# Patient Record
Sex: FEMALE | Race: WHITE | NOT HISPANIC OR LATINO | Employment: FULL TIME | URBAN - METROPOLITAN AREA
[De-identification: names, ages, dates, MRNs, and addresses within clinical notes are randomized per-mention and may not be internally consistent; named-entity substitution may affect disease eponyms.]

---

## 2018-03-05 ENCOUNTER — ANESTHESIA EVENT (INPATIENT)
Dept: OPERATING ROOM | Facility: HOSPITAL | Age: 38
End: 2018-03-05
Payer: COMMERCIAL

## 2018-03-05 DIAGNOSIS — O43.213 PLACENTA ACCRETA IN THIRD TRIMESTER: Primary | ICD-10-CM

## 2018-03-05 PROBLEM — Q79.62 EHLERS-DANLOS SYNDROME TYPE III: Status: ACTIVE | Noted: 2018-03-05

## 2018-03-05 PROBLEM — Z98.891 PREVIOUS CESAREAN SECTION: Status: ACTIVE | Noted: 2018-01-08

## 2018-03-05 PROBLEM — O43.219 PLACENTA ACCRETA, ANTEPARTUM: Status: ACTIVE | Noted: 2018-03-05

## 2018-03-05 PROBLEM — D68.51 FACTOR V LEIDEN CARRIER (CMS/HCC): Status: ACTIVE | Noted: 2018-03-05

## 2018-03-05 NOTE — ANESTHESIA PREPROCEDURE EVALUATION
Anesthesia ROS/MED HX    Anesthesia History - neg   Bleeding disorder (factor 5 leiden)  Pulmonary - neg  Neuro/Psych - neg  Cardiovascular- neg  ROS/MED HX Comments:    Anesthesia History: Placenta percreta   Musculoskeletal: Moris danelanas       Relevant Problems   No active problems are marked relevant to this note.       Physical Exam    Airway   Mallampati: II   TM distance: >3 FB   Neck ROM: full  Cardiovascular - normal   Rhythm: regular   Rate: normal  Pulmonary - normal   clear to auscultation  Other Findings   Back - neg   landmarks identified          Anesthesia Plan    Plan: other and general     Lines and Monitors: additional IV, arterial line and PIV     Airway: direct visual laryngoscopy and oral intubation   ASA 3  Blood Products:     Use of Blood Products Discussed: Yes     Consented to blood products  Anesthetic plan and risks discussed with: patient  Induction:    intravenous   Postop Plan:   Patient Disposition: ICU planned admission   Pain Management: IV analgesics  Comments:    Plan: GETA

## 2018-03-06 ENCOUNTER — ANESTHESIA (INPATIENT)
Dept: OPERATING ROOM | Facility: HOSPITAL | Age: 38
End: 2018-03-06
Payer: COMMERCIAL

## 2018-03-06 PROCEDURE — 63600000 HC DRUGS/DETAIL CODE: Performed by: OBSTETRICS & GYNECOLOGY

## 2018-03-06 PROCEDURE — 37000010 ANESTHESIA SPINAL BLOCK: Performed by: ANESTHESIOLOGY

## 2018-03-06 PROCEDURE — 63600000 HC DRUGS/DETAIL CODE: Performed by: ANESTHESIOLOGY

## 2018-03-06 PROCEDURE — 36430 TRANSFUSION BLD/BLD COMPNT: CPT | Performed by: ANESTHESIOLOGY

## 2018-03-06 PROCEDURE — 25800000 HC PHARMACY IV SOLUTIONS: Performed by: ANESTHESIOLOGY

## 2018-03-06 PROCEDURE — 25000000 HC PHARMACY GENERAL: Performed by: ANESTHESIOLOGY

## 2018-03-06 RX ORDER — NEOSTIGMINE METHYLSULFATE 1 MG/ML
INJECTION INTRAVENOUS AS NEEDED
Status: DISCONTINUED | OUTPATIENT
Start: 2018-03-06 | End: 2018-03-06 | Stop reason: SURG

## 2018-03-06 RX ORDER — FENTANYL CITRATE 50 UG/ML
INJECTION, SOLUTION INTRAMUSCULAR; INTRAVENOUS AS NEEDED
Status: DISCONTINUED | OUTPATIENT
Start: 2018-03-06 | End: 2018-03-06 | Stop reason: SURG

## 2018-03-06 RX ORDER — HYDROMORPHONE HYDROCHLORIDE 1 MG/ML
INJECTION, SOLUTION INTRAMUSCULAR; INTRAVENOUS; SUBCUTANEOUS AS NEEDED
Status: DISCONTINUED | OUTPATIENT
Start: 2018-03-06 | End: 2018-03-06 | Stop reason: SURG

## 2018-03-06 RX ORDER — PROPOFOL 10 MG/ML
INJECTION, EMULSION INTRAVENOUS AS NEEDED
Status: DISCONTINUED | OUTPATIENT
Start: 2018-03-06 | End: 2018-03-06 | Stop reason: SURG

## 2018-03-06 RX ORDER — KETOROLAC TROMETHAMINE 30 MG/ML
INJECTION, SOLUTION INTRAMUSCULAR; INTRAVENOUS AS NEEDED
Status: DISCONTINUED | OUTPATIENT
Start: 2018-03-06 | End: 2018-03-06 | Stop reason: SURG

## 2018-03-06 RX ORDER — SODIUM CHLORIDE, SODIUM GLUCONATE, SODIUM ACETATE, POTASSIUM CHLORIDE AND MAGNESIUM CHLORIDE 30; 37; 368; 526; 502 MG/100ML; MG/100ML; MG/100ML; MG/100ML; MG/100ML
INJECTION, SOLUTION INTRAVENOUS CONTINUOUS PRN
Status: DISCONTINUED | OUTPATIENT
Start: 2018-03-06 | End: 2018-03-06 | Stop reason: SURG

## 2018-03-06 RX ORDER — ONDANSETRON HYDROCHLORIDE 2 MG/ML
INJECTION, SOLUTION INTRAVENOUS AS NEEDED
Status: DISCONTINUED | OUTPATIENT
Start: 2018-03-06 | End: 2018-03-06 | Stop reason: SURG

## 2018-03-06 RX ORDER — ROCURONIUM BROMIDE 10 MG/ML
INJECTION, SOLUTION INTRAVENOUS AS NEEDED
Status: DISCONTINUED | OUTPATIENT
Start: 2018-03-06 | End: 2018-03-06 | Stop reason: SURG

## 2018-03-06 RX ORDER — GLYCOPYRROLATE 0.6MG/3ML
SYRINGE (ML) INTRAVENOUS AS NEEDED
Status: DISCONTINUED | OUTPATIENT
Start: 2018-03-06 | End: 2018-03-06 | Stop reason: SURG

## 2018-03-06 RX ORDER — DEXAMETHASONE SODIUM PHOSPHATE 4 MG/ML
INJECTION, SOLUTION INTRA-ARTICULAR; INTRALESIONAL; INTRAMUSCULAR; INTRAVENOUS; SOFT TISSUE AS NEEDED
Status: DISCONTINUED | OUTPATIENT
Start: 2018-03-06 | End: 2018-03-06 | Stop reason: SURG

## 2018-03-06 RX ADMIN — PROPOFOL 200 MG: 10 INJECTION, EMULSION INTRAVENOUS at 07:59

## 2018-03-06 RX ADMIN — KETOROLAC TROMETHAMINE 30 MG: 30 INJECTION, SOLUTION INTRAMUSCULAR at 08:55

## 2018-03-06 RX ADMIN — SODIUM CHLORIDE: 900 INJECTION, SOLUTION INTRAVENOUS at 08:10

## 2018-03-06 RX ADMIN — Medication 0.4 MG: at 08:46

## 2018-03-06 RX ADMIN — FENTANYL CITRATE 100 MCG: 50 INJECTION, SOLUTION INTRAMUSCULAR; INTRAVENOUS at 08:16

## 2018-03-06 RX ADMIN — SODIUM CHLORIDE, SODIUM GLUCONATE, SODIUM ACETATE, POTASSIUM CHLORIDE AND MAGNESIUM CHLORIDE: 526; 502; 368; 37; 30 INJECTION, SOLUTION INTRAVENOUS at 08:19

## 2018-03-06 RX ADMIN — HYDROMORPHONE HYDROCHLORIDE 1 MG: 1 INJECTION, SOLUTION INTRAMUSCULAR; INTRAVENOUS; SUBCUTANEOUS at 08:46

## 2018-03-06 RX ADMIN — ONDANSETRON 4 MG: 2 INJECTION INTRAMUSCULAR; INTRAVENOUS at 08:34

## 2018-03-06 RX ADMIN — Medication 2 G: at 07:58

## 2018-03-06 RX ADMIN — ROCURONIUM BROMIDE 50 MG: 10 INJECTION, SOLUTION INTRAVENOUS at 07:55

## 2018-03-06 RX ADMIN — Medication 3 MG: at 08:47

## 2018-03-06 RX ADMIN — DEXAMETHASONE SODIUM PHOSPHATE 8 MG: 4 INJECTION, SOLUTION INTRAMUSCULAR; INTRAVENOUS at 08:34

## 2018-03-06 ASSESSMENT — PAIN SCALES - GENERAL: PAIN_LEVEL: 3

## 2018-03-06 NOTE — ANESTHESIA POSTPROCEDURE EVALUATION
Patient: Lamar Sommers    Procedure Summary     Date:  18 Room / Location:  [facility] OR  / [facility] OR    Anesthesia Start:  727 Anesthesia Stop:  920    Procedures:        SECTION, POSS HYSTERECTOMY, POSS BLADDER REPAIR     * FOR OR * (N/A )      HYSTERECTOMY ABDOMINAL SUBTOTAL/SUPRACERVICAL      CYSTO & STENT  SUBPROCEDURE (N/A ) Diagnosis:       Previous  section      Placenta accreta in third trimester      (Previous  section [Z98.891])      (Placenta accreta in third trimester [O43.213])    Surgeon:  Whitney Bonilla DO; Trell Dyer MD Responsible Provider:  Liam Jesus MD    Anesthesia Type:  other, general ASA Status:  3          Anesthesia Type: other, general  PACU Vitals  3/6/2018 0909 - 3/6/2018 1009      3/6/2018 0920 3/6/2018 0925 3/6/2018 0930 3/6/2018 0935    BP: (!)  96/59 - 101/63 -    Temp: - - 36.4 °C (97.6 °F) -    Pulse: (!)  59 60 60 -    Resp: - 20 20 12    SpO2: 99 % 98 % 97 % -              3/6/2018 0940 3/6/2018 0950 3/6/2018 1000 3/6/2018 1005    BP: 106/67 103/66 105/67 -    Temp: - - - -    Pulse: 64 67 68 66    Resp: 19 (!)  24 18 (!)  25    SpO2: 98 % 99 % 99 % 99 %            Anesthesia Post Evaluation    Pain score: 3  Pain management: adequate  Mode of pain management: IV medication  Patient participation: complete - patient participated  Level of consciousness: awake and alert  Cardiovascular status: acceptable  Respiratory status: acceptable  Hydration status: acceptable  Anesthetic complications: no

## 2018-03-06 NOTE — ANESTHESIA PROCEDURE NOTES
Airway  Urgency: elective    Start Time: 3/6/2018 7:59 AM    General Information and Staff    Patient location during procedure: OR  Anesthesiologist: EVANGELINA GLORIA  Performed: anesthesiologist     Indications and Patient Condition  Indications for airway management: anesthesia  Sedation level: deep  Preoxygenated: yes  Patient position: sniffing      Final Airway Details  Final airway type: endotracheal airway      Successful airway: ETT    Successful intubation technique: direct laryngoscopy  Facilitating devices/methods: intubating stylet  Endotracheal tube insertion site: oral  Blade: Karol  Blade size: #3  Placement verified by: chest auscultation and capnometry   Measured from: lips  Number of attempts at approach: 1

## 2018-03-09 PROBLEM — O43.219 PLACENTA ACCRETA, ANTEPARTUM: Status: RESOLVED | Noted: 2018-03-05 | Resolved: 2018-03-09

## 2018-03-09 PROBLEM — Z98.891 PREVIOUS CESAREAN SECTION: Status: RESOLVED | Noted: 2018-01-08 | Resolved: 2018-03-09

## 2018-03-09 PROBLEM — O43.213 PLACENTA ACCRETA IN THIRD TRIMESTER: Status: RESOLVED | Noted: 2018-03-05 | Resolved: 2018-03-09

## 2019-03-19 ENCOUNTER — TRANSCRIBE ORDERS (OUTPATIENT)
Dept: LAB | Facility: CLINIC | Age: 39
End: 2019-03-19

## 2019-03-19 ENCOUNTER — APPOINTMENT (OUTPATIENT)
Dept: LAB | Facility: CLINIC | Age: 39
End: 2019-03-19
Attending: INTERNAL MEDICINE
Payer: COMMERCIAL

## 2019-03-19 DIAGNOSIS — R00.2 PALPITATIONS: ICD-10-CM

## 2019-03-19 DIAGNOSIS — I34.1 NONRHEUMATIC MITRAL VALVE PROLAPSE: ICD-10-CM

## 2019-03-19 DIAGNOSIS — E78.2 MIXED HYPERLIPIDEMIA: ICD-10-CM

## 2019-03-19 DIAGNOSIS — Q79.60 EHLERS-DANLOS SYNDROME: ICD-10-CM

## 2019-03-19 DIAGNOSIS — I34.1 NONRHEUMATIC MITRAL VALVE PROLAPSE: Primary | ICD-10-CM

## 2019-03-19 DIAGNOSIS — I95.1 ORTHOSTATIC HYPOTENSION: ICD-10-CM

## 2019-03-19 LAB
ALT SERPL-CCNC: 23 IU/L (ref 11–54)
ANION GAP SERPL CALC-SCNC: 11 MEQ/L (ref 3–15)
BUN SERPL-MCNC: 13 MG/DL (ref 8–20)
CALCIUM SERPL-MCNC: 9.3 MG/DL (ref 8.9–10.3)
CHLORIDE SERPL-SCNC: 104 MEQ/L (ref 98–109)
CHOLEST SERPL-MCNC: 183 MG/DL
CO2 SERPL-SCNC: 24 MEQ/L (ref 22–32)
CREAT SERPL-MCNC: 0.7 MG/DL
GFR SERPL CREATININE-BSD FRML MDRD: >60 ML/MIN/1.73M*2
GLUCOSE SERPL-MCNC: 81 MG/DL (ref 70–99)
HDLC SERPL-MCNC: 72 MG/DL
HDLC SERPL: 2.5 {RATIO}
LDLC SERPL CALC-MCNC: 100 MG/DL
NONHDLC SERPL-MCNC: 111 MG/DL
POTASSIUM SERPL-SCNC: 4.5 MEQ/L (ref 3.6–5.1)
SODIUM SERPL-SCNC: 139 MEQ/L (ref 136–144)
TRIGL SERPL-MCNC: 55 MG/DL (ref 30–149)

## 2019-03-19 PROCEDURE — 84460 ALANINE AMINO (ALT) (SGPT): CPT

## 2019-03-19 PROCEDURE — 80061 LIPID PANEL: CPT

## 2019-03-19 PROCEDURE — 36415 COLL VENOUS BLD VENIPUNCTURE: CPT

## 2019-03-19 PROCEDURE — 80048 BASIC METABOLIC PNL TOTAL CA: CPT

## 2019-11-22 ENCOUNTER — OFFICE VISIT (OUTPATIENT)
Dept: SURGERY | Facility: CLINIC | Age: 39
End: 2019-11-22
Payer: COMMERCIAL

## 2019-11-22 VITALS
HEIGHT: 66 IN | HEART RATE: 73 BPM | BODY MASS INDEX: 21.38 KG/M2 | DIASTOLIC BLOOD PRESSURE: 78 MMHG | OXYGEN SATURATION: 99 % | WEIGHT: 133 LBS | SYSTOLIC BLOOD PRESSURE: 118 MMHG

## 2019-11-22 DIAGNOSIS — R10.12 LEFT UPPER QUADRANT PAIN: Primary | ICD-10-CM

## 2019-11-22 PROCEDURE — 99204 OFFICE O/P NEW MOD 45 MIN: CPT | Performed by: SURGERY

## 2019-11-22 RX ORDER — ASPIRIN 81 MG/1
81 TABLET ORAL
COMMUNITY

## 2019-11-22 RX ORDER — DULOXETIN HYDROCHLORIDE 30 MG/1
1 CAPSULE, DELAYED RELEASE ORAL
COMMUNITY
Start: 2018-11-12

## 2019-11-22 RX ORDER — DOCUSATE SODIUM 100 MG/1
100 CAPSULE, LIQUID FILLED ORAL DAILY
COMMUNITY

## 2019-11-22 RX ORDER — ATORVASTATIN CALCIUM 10 MG/1
1 TABLET, FILM COATED ORAL
COMMUNITY
Start: 2019-01-17

## 2019-11-22 NOTE — LETTER
2019     Whitney Bonilla DO  [address]  [place] [place] [address]  [facility] [address]    Patient: Lamar Sommers  YOB: 1980  Date of Visit: 2019      Dear Dr. Bonilla:    Thank you for referring Lamar Sommers to me for evaluation. Below are my notes for this consultation.    If you have questions, please do not hesitate to call me. I look forward to following your patient along with you.         Sincerely,        Esperanza Nino MD        CC: DO Mica Mcallister Jennifer L, MD  2019  5:24 PM  Signed  Chief Complaint:   Chief Complaint   Patient presents with   • Consult     hernia   .    HPI     Patient is a 39 y.o. female who presents with the following: She was referred by Dr. Bonilal for a possible hernia.  She has had 2 pregnancies and 2 C-sections.  Her second  was combined with a hysterectomy for placenta accreta.  This was in 2018 by Dr. Bonilla.  Since then, she has had intermittent pain in the left upper quadrant that she sees with a very subtle bulge.  It seems to come and go.  It seems worse after exercise.  She sometimes sees a bulge and has a sensation in the right lower quadrant as well.  She has started doing more ab work in the gym and has noticed it more.  Discomfort is very vague and mild.  She notes lifelong constipation for which she takes Colace daily and Dulcolax every few weeks as needed.  She denies any nausea or vomiting.  No fevers or chills.  No weight loss or malaise.  No heartburn or reflux.  No chest pain or shortness of breath.  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  She has had no other abdominal surgery..     Medical History:   History reviewed. No pertinent past medical history.    Surgical History:   History reviewed. No pertinent surgical history.    Social History:   Social History     Tobacco Use    • Smoking status: Never Smoker   • Smokeless tobacco: Never Used   Substance Use Topics   • Alcohol use: Yes     Alcohol/week: 2.0 standard drinks     Types: 2 Glasses of wine per week     Comment: not while pregnant   • Drug use: No       Family History:   History reviewed. No pertinent family history.    Allergies:   Compazine [prochlorperazine] and Vicodin [hydrocodone-acetaminophen]    Current Medications:      Current Outpatient Medications:   •  aspirin 81 mg enteric coated tablet, Take 81 mg by mouth., Disp: , Rfl:   •  atorvastatin (LIPITOR) 10 mg tablet, Take 1 tablet by mouth., Disp: , Rfl:   •  docusate sodium (COLACE) 100 mg capsule, Take 100 mg by mouth daily., Disp: , Rfl:   •  DULoxetine (CYMBALTA) 30 mg capsule, Take 1 capsule by mouth., Disp: , Rfl:   •  silver sulfadiazine (SILVADENE) 1 % cream, Apply topically 2 (two) times a day for 18 doses. Prn rash/tape burn, Disp: 50 g, Rfl: 0    Review of Systems 14 other systems reviewed and findings not mentioned in HPI are not pertinent to the presenting problem.    Objective     Vital Signs for the last 24 hours:  Heart Rate:  [73] 73  BP: (118)/(78) 118/78    Physicial Exam  Gen: NAD  Skin: warm and dry, no jaundice  Affect: Nl  Gait: Nl  HEENT: NC/AT, EOMI, no scleral icterus  Neck: Supple  Cv: Reg  Lungs: CTA B/L  Abdomen: Soft, nontender, nondistended, positive bowel sounds; well-healed periumbilical midline incision; small, chronically incarcerated umbilical hernia, nontender; very subtle bulging at the upper aspect of the incision to the left of the midline and at the lower aspect of the incision to the right of the midline, only visible in the standing position; when supine I appreciate a possible weakness to the left of the incision at the upper aspect measuring about 2 cm in diameter and mildly tender; the rest of the fascia feels intact and there is no obvious definite hernia  Ext: WWP, no edema      TESTS: There is no pertinent imaging to  review    Assesment/Plan:    Problem List Items Addressed This Visit        Nervous    Left upper quadrant pain - Primary     Her history is suspicious for hernia, but her exam does not definitely confirm this.  Her tissue may be weaker and therefore a fascial defect harder to feel based on her Moris-Danlos syndrome.  I would like to check a CT of the abdomen and pelvis to look for an incisional or ventral hernia.  Further recommendations will based upon the results of the CT.  She should maintain a bowel regimen.  She restrictions at this point.         Relevant Orders    CT ABDOMEN PELVIS WITHOUT IV CONTRAST            Esperanza Nino MD 11/22/2019 5:24 PM

## 2019-11-22 NOTE — ASSESSMENT & PLAN NOTE
Her history is suspicious for hernia, but her exam does not definitely confirm this.  Her tissue may be weaker and therefore a fascial defect harder to feel based on her Moris-Danlos syndrome.  I would like to check a CT of the abdomen and pelvis to look for an incisional or ventral hernia.  Further recommendations will based upon the results of the CT.  She should maintain a bowel regimen.  She restrictions at this point.

## 2019-11-22 NOTE — H&P
Chief Complaint:   Chief Complaint   Patient presents with   • Consult     hernia   .    HPI     Patient is a 39 y.o. female who presents with the following: She was referred by Dr. Bonilla for a possible hernia.  She has had 2 pregnancies and 2 C-sections.  Her second  was combined with a hysterectomy for placenta accreta.  This was in 2018 by Dr. Bonilla.  Since then, she has had intermittent pain in the left upper quadrant that she sees with a very subtle bulge.  It seems to come and go.  It seems worse after exercise.  She sometimes sees a bulge and has a sensation in the right lower quadrant as well.  She has started doing more ab work in the gym and has noticed it more.  Discomfort is very vague and mild.  She notes lifelong constipation for which she takes Colace daily and Dulcolax every few weeks as needed.  She denies any nausea or vomiting.  No fevers or chills.  No weight loss or malaise.  No heartburn or reflux.  No chest pain or shortness of breath.  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  She has had no other abdominal surgery..     Medical History:   History reviewed. No pertinent past medical history.    Surgical History:   History reviewed. No pertinent surgical history.    Social History:   Social History     Tobacco Use   • Smoking status: Never Smoker   • Smokeless tobacco: Never Used   Substance Use Topics   • Alcohol use: Yes     Alcohol/week: 2.0 standard drinks     Types: 2 Glasses of wine per week     Comment: not while pregnant   • Drug use: No       Family History:   History reviewed. No pertinent family history.    Allergies:   Compazine [prochlorperazine] and Vicodin [hydrocodone-acetaminophen]    Current Medications:      Current Outpatient Medications:   •  aspirin 81 mg enteric coated tablet, Take 81 mg by mouth., Disp: , Rfl:   •  atorvastatin (LIPITOR) 10 mg tablet, Take 1  tablet by mouth., Disp: , Rfl:   •  docusate sodium (COLACE) 100 mg capsule, Take 100 mg by mouth daily., Disp: , Rfl:   •  DULoxetine (CYMBALTA) 30 mg capsule, Take 1 capsule by mouth., Disp: , Rfl:   •  silver sulfadiazine (SILVADENE) 1 % cream, Apply topically 2 (two) times a day for 18 doses. Prn rash/tape burn, Disp: 50 g, Rfl: 0    Review of Systems 14 other systems reviewed and findings not mentioned in HPI are not pertinent to the presenting problem.    Objective     Vital Signs for the last 24 hours:  Heart Rate:  [73] 73  BP: (118)/(78) 118/78    Physicial Exam  Gen: NAD  Skin: warm and dry, no jaundice  Affect: Nl  Gait: Nl  HEENT: NC/AT, EOMI, no scleral icterus  Neck: Supple  Cv: Reg  Lungs: CTA B/L  Abdomen: Soft, nontender, nondistended, positive bowel sounds; well-healed periumbilical midline incision; small, chronically incarcerated umbilical hernia, nontender; very subtle bulging at the upper aspect of the incision to the left of the midline and at the lower aspect of the incision to the right of the midline, only visible in the standing position; when supine I appreciate a possible weakness to the left of the incision at the upper aspect measuring about 2 cm in diameter and mildly tender; the rest of the fascia feels intact and there is no obvious definite hernia  Ext: WWP, no edema      TESTS: There is no pertinent imaging to review    Assesment/Plan:    Problem List Items Addressed This Visit        Nervous    Left upper quadrant pain - Primary     Her history is suspicious for hernia, but her exam does not definitely confirm this.  Her tissue may be weaker and therefore a fascial defect harder to feel based on her Moris-Danlos syndrome.  I would like to check a CT of the abdomen and pelvis to look for an incisional or ventral hernia.  Further recommendations will based upon the results of the CT.  She should maintain a bowel regimen.  She restrictions at this point.         Relevant Orders     CT ABDOMEN PELVIS WITHOUT IV CONTRAST            Esperanza Nino MD 11/22/2019 5:24 PM

## 2019-12-04 ENCOUNTER — TELEPHONE (OUTPATIENT)
Dept: SURGERY | Facility: CLINIC | Age: 39
End: 2019-12-04

## 2019-12-10 ENCOUNTER — HOSPITAL ENCOUNTER (OUTPATIENT)
Dept: RADIOLOGY | Facility: HOSPITAL | Age: 39
Discharge: HOME | End: 2019-12-10
Attending: SURGERY
Payer: COMMERCIAL

## 2019-12-10 DIAGNOSIS — R10.12 LEFT UPPER QUADRANT PAIN: ICD-10-CM

## 2019-12-10 PROCEDURE — 74176 CT ABD & PELVIS W/O CONTRAST: CPT

## 2021-04-14 DIAGNOSIS — Z23 ENCOUNTER FOR IMMUNIZATION: ICD-10-CM
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
## 2021-04-15 VITALS — BODY MASS INDEX: 21.38 KG/M2 | WEIGHT: 133 LBS | HEIGHT: 66 IN

## 2021-04-16 ENCOUNTER — TELEMEDICINE (OUTPATIENT)
Dept: GYNECOLOGIC ONCOLOGY | Facility: CLINIC | Age: 41
End: 2021-04-16
Payer: COMMERCIAL

## 2021-04-16 DIAGNOSIS — F52.9 FEMALE SEXUAL DYSFUNCTION: Primary | ICD-10-CM

## 2021-04-16 DIAGNOSIS — F43.10 PTSD (POST-TRAUMATIC STRESS DISORDER): ICD-10-CM

## 2021-04-16 PROCEDURE — 99205 OFFICE O/P NEW HI 60 MIN: CPT | Mod: 95 | Performed by: OBSTETRICS & GYNECOLOGY

## 2021-04-16 PROCEDURE — 200200 PR NO CHARGE: Performed by: OBSTETRICS & GYNECOLOGY

## 2021-04-16 PROCEDURE — G2212 PROLONG OUTPT/OFFICE VIS: HCPCS | Mod: 95 | Performed by: OBSTETRICS & GYNECOLOGY

## 2021-04-16 NOTE — PATIENT INSTRUCTIONS
Dr. Nichelle Coles  Frequently Asked Questions    1.  Who should I see/contact for routine gynecologic care?   Please be aware that my evaluation does not include routine gyn care (eg. contraception, abnormal bleeding, vaginitis, STD screening, cancer screening or surveillance), thus you should continue routine gyn care with your primary OBGYN/women's health provider:   Dr. Bonilla.   Next annual gyn office visit due: 3/2021.    2.  What are we going to work on?  Address sexual issues.    3.  Who will you send updates, medical records including progress notes to?  As verbally consented by you, I will send this information to these providers:   Dr. Bonilla, Dr. Emmanuel, Dr. Mann, Courtney Torres    4. Can I view my progress note from the visit?   YES: If you have enrolled in the [employer] patient portal, you will have access to the visit progress notes, as soon as I have completed the note (usually within 24-48 hours after the visit).  Please try to review the progress note, to confirm accuracy and aid in collaborative care.  [employer] allows for note addendums up to 30 days after that visit. If you have questions about the progress note, or have addendum requests, please call the office to schedule a time for us to review the note together, and modify if needed.     5.  How best to contact you for questions/concerns?  Please leave a message with the main office staff [phone] during general office hours (Mon-Fri, 8:30-4:30p), or message me through [employer], and I will try to return your call within 2 business days.  Please note: Showbiet is for non-emergent, non-urgent questions/concerns. If the question/concern requires more than a simple answer, I may recommend that we discuss this via telemedicine or in-office appointment.      6.   Do you have any financial ties with any products, meds or medical devices?  No, I do NOT.  My recommendations are based on clinical experience and the available  data/research.    7.   What if we run into each other locally?  Let's keep the conversation light, in order to protect confidentiality. Please know that we will not discuss issues/treatment.

## 2022-04-11 ENCOUNTER — TRANSCRIBE ORDERS (OUTPATIENT)
Dept: REGISTRATION | Facility: CLINIC | Age: 42
End: 2022-04-11

## 2022-04-11 ENCOUNTER — TRANSCRIBE ORDERS (OUTPATIENT)
Dept: SCHEDULING | Age: 42
End: 2022-04-11

## 2022-04-11 ENCOUNTER — HOSPITAL ENCOUNTER (OUTPATIENT)
Dept: RADIOLOGY | Facility: CLINIC | Age: 42
Discharge: HOME | End: 2022-04-11
Attending: INTERNAL MEDICINE
Payer: COMMERCIAL

## 2022-04-11 DIAGNOSIS — J45.20 MILD INTERMITTENT ASTHMA, UNCOMPLICATED: ICD-10-CM

## 2022-04-11 DIAGNOSIS — R06.00 DYSPNEA, UNSPECIFIED: ICD-10-CM

## 2022-04-11 DIAGNOSIS — R06.00 DYSPNEA, UNSPECIFIED: Primary | ICD-10-CM

## 2022-04-11 PROCEDURE — 71046 X-RAY EXAM CHEST 2 VIEWS: CPT

## 2022-04-25 ENCOUNTER — HOSPITAL ENCOUNTER (OUTPATIENT)
Dept: RADIOLOGY | Facility: HOSPITAL | Age: 42
Discharge: HOME | End: 2022-04-25
Attending: INTERNAL MEDICINE
Payer: COMMERCIAL

## 2022-04-25 DIAGNOSIS — R06.00 DYSPNEA, UNSPECIFIED: ICD-10-CM

## 2022-04-25 PROCEDURE — 71250 CT THORAX DX C-: CPT

## 2022-05-23 ENCOUNTER — HOSPITAL ENCOUNTER (OUTPATIENT)
Dept: PULMONOLOGY | Facility: HOSPITAL | Age: 42
Discharge: HOME | End: 2022-05-23
Attending: INTERNAL MEDICINE
Payer: COMMERCIAL

## 2022-05-23 DIAGNOSIS — J45.20 MILD INTERMITTENT ASTHMA, UNCOMPLICATED: ICD-10-CM

## 2022-05-23 PROCEDURE — 25000013 HC METHACHOLINE CHLORIDE THROUGH NEBULIZER, PER 1 MG

## 2022-05-23 PROCEDURE — 94070 EVALUATION OF WHEEZING: CPT

## 2022-05-23 PROCEDURE — 25000000 HC PHARMACY GENERAL: Performed by: INTERNAL MEDICINE

## 2022-05-23 PROCEDURE — 95070 INHLJ BRNCL CHALLENGE TSTG: CPT

## 2022-05-23 RX ORDER — ALBUTEROL SULFATE 0.83 MG/ML
2.5 SOLUTION RESPIRATORY (INHALATION) ONCE
Status: COMPLETED | OUTPATIENT
Start: 2022-05-23 | End: 2022-05-23

## 2022-05-23 RX ADMIN — ALBUTEROL SULFATE 2.5 MG: 2.5 SOLUTION RESPIRATORY (INHALATION) at 15:42

## 2022-05-24 ENCOUNTER — HOSPITAL ENCOUNTER (EMERGENCY)
Facility: HOSPITAL | Age: 42
Discharge: HOME | End: 2022-05-24
Attending: EMERGENCY MEDICINE
Payer: COMMERCIAL

## 2022-05-24 ENCOUNTER — APPOINTMENT (EMERGENCY)
Dept: RADIOLOGY | Facility: HOSPITAL | Age: 42
End: 2022-05-24
Attending: EMERGENCY MEDICINE
Payer: COMMERCIAL

## 2022-05-24 VITALS
SYSTOLIC BLOOD PRESSURE: 121 MMHG | HEART RATE: 86 BPM | RESPIRATION RATE: 18 BRPM | OXYGEN SATURATION: 95 % | DIASTOLIC BLOOD PRESSURE: 74 MMHG | TEMPERATURE: 97 F

## 2022-05-24 DIAGNOSIS — S16.1XXA CERVICAL STRAIN, ACUTE, INITIAL ENCOUNTER: Primary | ICD-10-CM

## 2022-05-24 DIAGNOSIS — S06.0X0A CONCUSSION WITHOUT LOSS OF CONSCIOUSNESS, INITIAL ENCOUNTER: ICD-10-CM
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
## 2022-05-24 PROCEDURE — 99284 EMERGENCY DEPT VISIT MOD MDM: CPT | Mod: 25

## 2022-05-24 PROCEDURE — G1004 CDSM NDSC: HCPCS

## 2022-05-24 PROCEDURE — 70450 CT HEAD/BRAIN W/O DYE: CPT | Mod: MG

## 2022-05-25 ENCOUNTER — TRANSCRIBE ORDERS (OUTPATIENT)
Dept: REGISTRATION | Facility: REHABILITATION | Age: 42
End: 2022-05-25

## 2022-05-25 DIAGNOSIS — S06.0X0A CONCUSSION WITHOUT LOSS OF CONSCIOUSNESS, INITIAL ENCOUNTER: Primary | ICD-10-CM
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
## 2022-06-07 ENCOUNTER — TELEPHONE (OUTPATIENT)
Dept: SCHEDULING | Facility: REHABILITATION | Age: 42
End: 2022-06-07
Payer: COMMERCIAL

## 2022-06-07 NOTE — TELEPHONE ENCOUNTER
Left message for patient.  Asked that she call back with her Auto Insurance information and claim number.  The [employer] information she originally provided is the other 's claim.

## 2022-06-08 ENCOUNTER — TELEPHONE (OUTPATIENT)
Dept: SCHEDULING | Facility: REHABILITATION | Age: 42
End: 2022-06-08
Payer: COMMERCIAL

## 2022-06-08 NOTE — TELEPHONE ENCOUNTER
Left second message for patient.  Asked patient to call back with her Auto Insurance information and claim number.

## 2022-06-14 ENCOUNTER — HOSPITAL ENCOUNTER (OUTPATIENT)
Dept: OCCUPATIONAL THERAPY | Age: 42
Setting detail: THERAPIES SERIES
Discharge: HOME | End: 2022-06-14
Attending: FAMILY MEDICINE
Payer: COMMERCIAL

## 2022-06-14 DIAGNOSIS — S06.0X0A CONCUSSION WITHOUT LOSS OF CONSCIOUSNESS, INITIAL ENCOUNTER: ICD-10-CM
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
## 2022-06-14 PROCEDURE — 97166 OT EVAL MOD COMPLEX 45 MIN: CPT | Mod: GO

## 2022-06-14 PROCEDURE — 97535 SELF CARE MNGMENT TRAINING: CPT | Mod: GO
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
## 2022-06-15 ENCOUNTER — HOSPITAL ENCOUNTER (OUTPATIENT)
Dept: PHYSICAL THERAPY | Age: 42
Setting detail: THERAPIES SERIES
Discharge: HOME | End: 2022-06-15
Attending: FAMILY MEDICINE
Payer: COMMERCIAL
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
## 2022-06-15 DIAGNOSIS — S06.0X0A CONCUSSION WITHOUT LOSS OF CONSCIOUSNESS, INITIAL ENCOUNTER: ICD-10-CM

## 2022-06-15 PROCEDURE — 97162 PT EVAL MOD COMPLEX 30 MIN: CPT | Mod: GP

## 2022-06-15 PROCEDURE — 97530 THERAPEUTIC ACTIVITIES: CPT | Mod: GP

## 2022-06-15 PROCEDURE — 97112 NEUROMUSCULAR REEDUCATION: CPT | Mod: GP
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
## 2022-06-15 NOTE — OP OT TREATMENT LOG
"VISION/CONCUSSION OT FLOW SHEET    OT Vision/mTBI  EXERCISES CURRENT SESSION TIME   NEURO RE-ED  CPT 40066 TOTAL TIME FOR SESSION Not performed   Initial Evaluation 06/14/22 - Completed the initial evaluation - metrics to be completed as symptoms allow - unable to complete today secondary to strong headache and fatigue.    Dynavision     VTS3/VTS4     CPT     BITs     NVR     Saccadic Fixation     Ocular Motor Control     Visual Tracing     3D Tracking     Visual Perception     Convergence/Divergence     Accommodation     Visual Stimulation     THER ACT  CPT 03213 TOTAL TIME FOR SESSION 0-7 Minutes   Pain, Vitals, Meds, Etc.  Reviewed   HEP     Visual Endurance      Work/School Simulation      SELF CARE  CPT 87827 TOTAL TIME FOR SESSION 8-22 Minutes   PCS Symptom Management/Education  Provided the \"1 to 10 functional concussion symptom scale\" and energy and symptom management techniques and strategies. Patient demonstrated good verbal understanding of the information and states will try to apply this approach in daily life.     ADL/IADLs                                                                                      "

## 2022-06-15 NOTE — OP PT TREATMENT LOG
VESTIBULAR PT FLOWSHEET    P.T. TREATMENT LOG   Precautions:    Eval Date:  Progress Note:    POC expires:  Insurance Limits:   Treatment Current Session Time   CANALITH  REPOSITIONING TREATMENT  (CPT 13635) Y/N Notes Not performed   CRT      NEURO RE-ED  (CPT 71404) Y/N Notes 8-22 Minutes   BPPV ASSESSMENT N No recent c/o vertigo   VOMS Y See objective section   VOR CANCELLATION                      Standing H/VVOR-C     Ambulation w/ H/VVOR-C     VOR / GAZE STABILITY     Standing H/VVOR     Ambulation w/H/VVOR     H/VVOR on compliant surfaces     Functional VOR     HABITUATION     Ball circles     Repetitive functional movements     BALANCE- STATIC     On floor     Airex foam     Rockerboard     BALANCE- DYNAMIC     Ambulation-head turns/nods     Ambulation - 180 & 360 degree turns     Retro ambulation EO     Ambulation with EC     Obstacles     Tandem     OKS     MSQ  SOT  FGA  DVA       Y       See objective section   THER-EX   (CPT 89112) Y/N SETS REPS LOAD Not performed   CARDIOVASCULAR               THER ACT  (CPT 72872) Y/N  8-22 Minutes   Review pain, meds, falls, vitals, symptoms Y    *Subjective Measures   ABC  DHI     Y  Y   (6/15) 86%  (6/15) 44/100   Patient Education/HEP Y (6/15) Patient educated on function of vestibular system, sensory integration, PCS, results of objective testing, POC going forward. Verbalizes understanding.
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
## 2022-06-20 ENCOUNTER — HOSPITAL ENCOUNTER (OUTPATIENT)
Dept: OCCUPATIONAL THERAPY | Age: 42
Setting detail: THERAPIES SERIES
Discharge: HOME | End: 2022-06-20
Attending: FAMILY MEDICINE
Payer: COMMERCIAL

## 2022-06-20 DIAGNOSIS — S06.0X0A CONCUSSION WITHOUT LOSS OF CONSCIOUSNESS, INITIAL ENCOUNTER: Primary | ICD-10-CM

## 2022-06-20 PROCEDURE — 97112 NEUROMUSCULAR REEDUCATION: CPT | Mod: GO

## 2022-06-20 PROCEDURE — 97530 THERAPEUTIC ACTIVITIES: CPT | Mod: GO

## 2022-06-21 ENCOUNTER — HOSPITAL ENCOUNTER (OUTPATIENT)
Dept: PHYSICAL THERAPY | Age: 42
Setting detail: THERAPIES SERIES
Discharge: HOME | End: 2022-06-21
Attending: FAMILY MEDICINE
Payer: COMMERCIAL

## 2022-06-21 DIAGNOSIS — S06.0X0A CONCUSSION WITHOUT LOSS OF CONSCIOUSNESS, INITIAL ENCOUNTER: Primary | ICD-10-CM

## 2022-06-21 PROCEDURE — 97530 THERAPEUTIC ACTIVITIES: CPT | Mod: GP

## 2022-06-21 PROCEDURE — 97112 NEUROMUSCULAR REEDUCATION: CPT | Mod: GP

## 2022-06-21 NOTE — OP OT TREATMENT LOG
"VISION/CONCUSSION OT FLOW SHEET    OT Vision/mTBI  EXERCISES CURRENT SESSION TIME   NEURO RE-ED  CPT 59619 TOTAL TIME FOR SESSION 23-37 Minutes   Initial Evaluation 06/14/22 - Completed the initial evaluation - metrics to be completed as symptoms allow - unable to complete today secondary to strong headache and fatigue.    Dynavision 6/20/22  D2 divided attention, reaction timing, and task endurance trials as follows:          Program                         Score               Avg Reaction             B 5 sec                          79                       0.75 sec      Poor overall tolerance with an increase in symptoms during the activity - 4/10 Headache and fatigue -  Rest required         VTS3/VTS4     CPT     BITs     NVR     Saccadic Fixation 06/20/22 - completed saccades measures - refer to \"vision\" section attached for scores    Ocular Motor Control     Visual Tracing     3D Tracking     Visual Perception     Convergence/Divergence     Accommodation     Visual Stimulation     THER ACT  CPT 17999 TOTAL TIME FOR SESSION 23-37 Minutes   Pain, Vitals, Meds, Etc. Reviewed Reviewed   HEP 6/20/22 - provided and practiced the following home exercises to be completed every other day once AM and once PM using the \"1 to 10 functional concusion symptom scale\" as a guide to intensity  1) Level 1 horizontal saccades (needed quadrant cover and blue overlay to better tolerate all exercises provided today)  2) Level 1 vertical saccades  3) Leval 1 Tracing  4) Pencil Push Ups      Visual Endurance      Work/School Simulation      SELF CARE  CPT 16536 TOTAL TIME FOR SESSION 8-22 Minutes   PCS Symptom Management/Education 6/20/22  Reviewed the use of \"1 to 10 functional concussion symptom scale\" and energy and symptom management techniques and strategies for exercises and daily routine. Patient demonstrated good verbal understanding of the information and states will try to apply this approach in daily life. Provided " information and practiced the use of blue overlays.        ADL/IADLs
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
## 2022-06-21 NOTE — OP PT TREATMENT LOG
"VESTIBULAR PT FLOWSHEET    P.T. TREATMENT LOG   Precautions:    Eval Date: 6-15-22 Progress Note:    POC expires: 9-13-22 Insurance Limits:   Treatment Current Session Time   CANALITH  REPOSITIONING TREATMENT  (CPT 62271) Y/N Notes Not performed   CRT      NEURO RE-ED  (CPT 93019) Y/N Notes 38-52 Minutes   BPPV ASSESSMENT N No recent c/o vertigo   VOMS  See objective section   VOR CANCELLATION                      Standing H/VVOR-C y 6-21-22: FA with 1 inch B hand held:  - HVORC toward windows of gym X 45\"- 3/10 dizziness  FA with 1 inch B on reacher facing grey wall:  - VVORC X 1 minute- no dizziness     Ambulation w/ H/VVOR-C     VOR / GAZE STABILITY     Standing H/VVOR y 6-21-22: Standing with FA and 6 feet from 1 inch B on wall:  -HVOR X 1 at SSV X 30\"- no increase/ fatigued  -VVOR X 1 at SSV X 30\"- no increase/ fatigue     Ambulation w/H/VVOR     H/VVOR on compliant surfaces     Functional VOR     HABITUATION     Ball circles     Repetitive functional movements     BALANCE- STATIC     On floor     Airex foam     Rockerboard     BALANCE- DYNAMIC     Ambulation-head turns/nods     Ambulation - 180 & 360 degree turns     Retro ambulation EO     Ambulation with EC     Obstacles     Tandem     OKS     MSQ  SOT  FGA  DVA y  y       6-21-22:17%   6-21-22: 85%    See objective section   THER-EX   (CPT 64741) Y/N SETS REPS LOAD Not performed   CARDIOVASCULAR               THER ACT  (CPT 46569) Y/N  8-22 Minutes   Review pain, meds, falls, vitals, symptoms Y    *Subjective Measures   ABC  DHI        (6/15) 86%  (6/15) 44/100   Patient Education/HEP Y (6/15) Patient educated on function of vestibular system, sensory integration, PCS, results of objective testing, POC going forward. Verbalizes understanding.   6-21-22: Issued HEP as follows with patient demonstrating proper technique in clinic and verbalizing understanding: VORC in standing, VOR X 1 in standing.       Motion Sensitivity Quotient   DATE:   6-21-22           " "      Baseline Level (0-5):     0/5               MSQ Score:17%    POSITION INTENSITY ADJUSTED  INTENSITY DURATION SCORE   Sitting to Supine No increase- \"feels better\"      Supine to Left Side Lying No increase      Supine to Right Side Lying No increase      Supine to Sit 2.5 2.5 2 4.5   Left Hallpike NT      to  Sitting NT      Right Hallpike NT      to Sitting NT      Sitting Nose to Left Knee No increase dizz, increased HA      to Sitting 4 4 2 6   Sitting Nose to Right Knee No increase      to Sitting 4 4 2 6   Sitting Head Rotation (4X) 3 3 1 4   Sitting Head Flex & Ext (4X) 2 2 0 2   Standing  360° Turn to Right 1.5 1.5 0 1.5   Standing 360° Turn to Left 3.5 3.5 1 4.5     Intensity: Scale 0-5 (0= no symptoms and 5= severe symptoms)  Adjusted Intensity: Intensity Level - Baseline Level  Duration: Scale from 0 to 3 for return to baseline (5-10”=1, 11-30”=2, >  30”=3)  Score: Adjusted Intensity + Duration  MOTION SENSITIVITY QUOTIENT % = # Provoking Positions X Total Score   X 100                                                                                                          2048   *If < 16 positions are tested, 2048 is replaced with 8 X (# of positions tested)2          "

## 2022-06-21 NOTE — PATIENT INSTRUCTIONS
Gaze Stabilization: Tip Card    1.Target must remain in focus, not blurry, and appear stationary while head is in motion.  2.Perform exercises with small head movements (45° to either side of midline).  3.Increase speed of head motion so long as target is in focus.  4.If you wear eyeglasses, be sure you can see target through lens (therapist will give specific instructions for bifocal / progressive lenses).  5.These exercises may provoke dizziness or nausea. Work through these symptoms. If too dizzy, slow head movement slightly. Rest between each exercise.  6.Exercises demand concentration; avoid distractions.  7.For safety, perform standing exercises close to a counter, wall, corner, or next to someone.    Copyright © [employer]. All rights reserved.   Gaze Stabilization: Standing Feet Apart        Feet shoulder width apart, keeping eyes on target on wall __6__ feet away, tilt head down 15-30° and move head side to side for __60__ seconds. Repeat while moving head up and down for _60___ seconds.  Do __3__ sessions per day.      Copyright © [employer]. All rights reserved.   Visuo-Vestibular: Head / Eyes Moving in Same Direction        Holding a target, keep eyes on target and slowly move target, head, and eyes in SAME direction side to side for __60__ seconds. Repeat up and down for one minute. Perform standing  Do __3__ sessions per day. Repeat using target on pattern background.    Copyright © [employer]. All rights reserved.
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
## 2022-06-23 ENCOUNTER — HOSPITAL ENCOUNTER (OUTPATIENT)
Dept: OCCUPATIONAL THERAPY | Age: 42
Setting detail: THERAPIES SERIES
Discharge: HOME | End: 2022-06-23
Attending: FAMILY MEDICINE
Payer: COMMERCIAL

## 2022-06-23 DIAGNOSIS — S06.0X0A CONCUSSION WITHOUT LOSS OF CONSCIOUSNESS, INITIAL ENCOUNTER: Primary | ICD-10-CM

## 2022-06-23 PROCEDURE — 97530 THERAPEUTIC ACTIVITIES: CPT | Mod: GO

## 2022-06-23 PROCEDURE — 97535 SELF CARE MNGMENT TRAINING: CPT | Mod: GO
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
## 2022-06-24 NOTE — OP OT TREATMENT LOG
"VISION/CONCUSSION OT FLOW SHEET    OT Vision/mTBI  EXERCISES CURRENT SESSION TIME   NEURO RE-ED  CPT 58657 TOTAL TIME FOR SESSION 0-7 Minutes   Initial Evaluation 06/14/22 - Completed the initial evaluation - metrics to be completed as symptoms allow - unable to complete today secondary to strong headache and fatigue.    Dynavision 6/20/22  D2 divided attention, reaction timing, and task endurance trials as follows:          Program                         Score               Avg Reaction             B 5 sec                          79                       0.75 sec      Poor overall tolerance with an increase in symptoms during the activity - 4/10 Headache and fatigue -  Rest required         VTS3/VTS4     CPT     BITs     NVR     Saccadic Fixation 06/20/22 - completed saccades measures - refer to \"vision\" section attached for scores    Ocular Motor Control     Visual Tracing     3D Tracking     Visual Perception     Convergence/Divergence     Accommodation     Visual Stimulation     THER ACT  CPT 22954 TOTAL TIME FOR SESSION 8-22 Minutes   Pain, Vitals, Meds, Etc. Reviewed Reviewed   HEP 06/23/22  Reviewed and additionally explained and practiced the following home exercises to be completed every other day once AM and once PM using the \"1 to 10 functional concusion symptom scale\" as a guide to intensity  1) Level 1 horizontal saccades (needed quadrant cover and blue overlay to better tolerate all exercises provided today)  2) Level 1 vertical saccades  3) Leval 1 Tracing  4) Pencil Push Ups      Visual Endurance      Work/School Simulation      SELF CARE  CPT 42359 TOTAL TIME FOR SESSION 38-52 Minutes   PCS Symptom Management/Education 6/23/22  Reviewed the use of \"1 to 10 functional concussion symptom scale\" and energy and symptom management techniques and strategies for exercises and daily routine especially as a result of her episode of severe symptoms on 6/21/22. Specifically discussed use of strategies to " delegate some duties temporarily during recovery. Patient demonstrated good verbal understanding of the information and states will try to apply this approach in daily life. Provided information and practiced the use of blue overlays.        ADL/IADLs

## 2022-06-27 ENCOUNTER — HOSPITAL ENCOUNTER (OUTPATIENT)
Dept: OCCUPATIONAL THERAPY | Age: 42
Setting detail: THERAPIES SERIES
Discharge: HOME | End: 2022-06-27
Attending: FAMILY MEDICINE
Payer: COMMERCIAL

## 2022-06-27 DIAGNOSIS — S06.0X0A CONCUSSION WITHOUT LOSS OF CONSCIOUSNESS, INITIAL ENCOUNTER: Primary | ICD-10-CM

## 2022-06-27 PROCEDURE — 97112 NEUROMUSCULAR REEDUCATION: CPT | Mod: GO

## 2022-06-27 PROCEDURE — 97535 SELF CARE MNGMENT TRAINING: CPT | Mod: GO

## 2022-06-27 NOTE — OP OT TREATMENT LOG
"VISION/CONCUSSION OT FLOW SHEET    OT Vision/mTBI  EXERCISES CURRENT SESSION TIME   NEURO RE-ED  CPT 60384 TOTAL TIME FOR SESSION 38-52 Minutes   Initial Evaluation 06/14/22 - Completed the initial evaluation - metrics to be completed as symptoms allow - unable to complete today secondary to strong headache and fatigue.    Dynavision 6/27/20  D2 divided attention, reaction timing, and task endurance trials as follows:          Program                         Score               Avg Reaction             B 5 sec                           87                       0.68 sec   3 sec 30% Green full board   65R/29G          0.65R & 0.61 G  Fair overall tolerance with a mild increase in symptoms during the activity - 2/10 dizziness and headache  - 1/10 with short rest         6/20/22  D2 divided attention, reaction timing, and task endurance trials as follows:          Program                         Score               Avg Reaction             B 5 sec                          79                       0.75 sec      Poor overall tolerance with an increase in symptoms during the activity - 4/10 Headache and fatigue -  Rest required         VTS3/VTS4     CPT     BITs     NVR     Saccadic Fixation 06/27/22  Patient completed a close focus visual scanning and discrimination activity using the \"Stepping Stones\" challenge. She needed two cues to complete -she did not demonstrate good attention to details - she became verbally frustrated - could not find the envelope - 2/10 dizziness.    06/20/22 - completed saccades measures - refer to \"vision\" section attached for scores    Ocular Motor Control     Visual Tracing     3D Tracking 6/27/22  Seated tracking activity with \"Capture and Control\" using fabric rackets and a small blue spike ball - pt able to engage for approx 5 tosses then symptoms elevated - stopped activity - provided rest - this was the end of the session.    Visual Perception     Convergence/Divergence   " "  Accommodation     Visual Stimulation     THER ACT  CPT 42905 TOTAL TIME FOR SESSION 0-7 Minutes   Pain, Vitals, Meds, Etc. Reviewed Reviewed   HEP 06/23/22  Reviewed and additionally explained and practiced the following home exercises to be completed every other day once AM and once PM using the \"1 to 10 functional concusion symptom scale\" as a guide to intensity  1) Level 1 horizontal saccades (needed quadrant cover and blue overlay to better tolerate all exercises provided today)  2) Level 1 vertical saccades  3) Leval 1 Tracing  4) Pencil Push Ups      Visual Endurance      Work/School Simulation      SELF CARE  CPT 83836 TOTAL TIME FOR SESSION 8-22 Minutes   PCS Symptom Management/Education 6/27/22  Patient encouraged to slow   Reviewed the use of \"1 to 10 functional concussion symptom scale\" and energy and symptom management techniques and strategies for exercises and daily routine especially as a result of her episode of severe symptoms on 6/21/22. Specifically discussed use of strategies to delegate some duties temporarily during recovery. Patient demonstrated good verbal understanding of the information and states will try to apply this approach in daily life. Provided information and practiced the use of blue overlays.        ADL/IADLs                                                                                      "

## 2022-06-28 ENCOUNTER — HOSPITAL ENCOUNTER (OUTPATIENT)
Dept: PHYSICAL THERAPY | Age: 42
Setting detail: THERAPIES SERIES
Discharge: HOME | End: 2022-06-28
Attending: FAMILY MEDICINE
Payer: COMMERCIAL

## 2022-06-28 DIAGNOSIS — S06.0X0A CONCUSSION WITHOUT LOSS OF CONSCIOUSNESS, INITIAL ENCOUNTER: Primary | ICD-10-CM

## 2022-06-28 PROCEDURE — 97112 NEUROMUSCULAR REEDUCATION: CPT | Mod: GP

## 2022-06-28 PROCEDURE — 97530 THERAPEUTIC ACTIVITIES: CPT | Mod: GP

## 2022-06-28 PROCEDURE — 97110 THERAPEUTIC EXERCISES: CPT | Mod: GP
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
## 2022-06-28 NOTE — OP PT TREATMENT LOG
"VESTIBULAR PT FLOWSHEET    P.T. TREATMENT LOG   Precautions:    Eval Date: 6-15-22 Progress Note:    POC expires: 9-13-22 Insurance Limits:   Treatment Current Session Time   CANALITH  REPOSITIONING TREATMENT  (CPT 63232) Y/N Notes Not performed   CRT      NEURO RE-ED  (CPT 85379) Y/N Notes 23-37 Minutes   BPPV ASSESSMENT N No recent c/o vertigo   VOMS  See objective section   VOR CANCELLATION                      Standing H/VVOR-C             y 6-21-22: FA with 1 inch B hand held:  - HVORC toward windows of gym X 45\"- 3/10 dizziness  FA with 1 inch B on reacher facing grey wall:  - VVORC X 1 minute- no dizziness    FA with hallway as background, 1\" B held at arms length, x30\"  -HVOR-C: mild-mod disorientation, +eye strain during set, 2 minutes seated for full resolution     Ambulation w/ H/VVOR-C     VOR / GAZE STABILITY     Standing H/VVOR           y to below 6-21-22: Standing with FA and 6 feet from 1 inch B on wall:  -HVOR X 1 at SSV X 30\"- no increase/ fatigued  -VVOR X 1 at SSV X 30\"- no increase/ fatigue    5' from 1\" B on grey background  -HVORx1: 80 bpm x 60\" - no sxs elicited  90 bpm x 60\" - mild blurring ~ 1/2 way through, slight disorientation  100 bpm x 60\" - \"slight disorientation\", 2 minutes seated for full resolution    -VVORx1: 90 bpm x 60\" - blurring at 25 sec, one minute for full resolution, 5 sec for resolution  100 bpm x 60\" - double-vision, \"haze around B\", 2 minutes seated for full resolution         Ambulation w/H/VVOR     H/VVOR on compliant surfaces     Functional VOR     HABITUATION     Ball circles     Repetitive functional movements NV    BALANCE- STATIC     On floor     Airex foam NV    Rockerboard NV    BALANCE- DYNAMIC     Ambulation-head turns/nods     Ambulation - 180 & 360 degree turns     Retro ambulation EO     Ambulation with EC     Obstacles     Tandem     OKS     MSQ  SOT  FGA  DVA        6-21-22:17%   6-21-22: 85%    See objective section   THER-EX   (CPT 79249) Y/N SETS " "REPS LOAD 8-22 Minutes   CARDIOVASCULAR     Nustep Y L1 x10 min, 70-80 spm, small towel to cover screen, mild fatigue at end of 10 min   TM NV    THER ACT  (CPT 29539) Y/N  8-22 Minutes   Review pain, meds, falls, vitals, symptoms Y    *Subjective Measures   ABC  DHI        (6/15) 86%  (6/15) 44/100   Patient Education/HEP                   Y (6/15) Patient educated on function of vestibular system, sensory integration, PCS, results of objective testing, POC going forward. Verbalizes understanding.   6-21-22: Issued HEP as follows with patient demonstrating proper technique in clinic and verbalizing understanding: VORC in standing, VOR X 1 in standing.    (6/28) Downloaded metronome lion and discussed with patient appropriate use, dosing, and progression. Demonstrates good understanding.        Motion Sensitivity Quotient   DATE:   6-21-22                 Baseline Level (0-5):     0/5               MSQ Score:17%    POSITION INTENSITY ADJUSTED  INTENSITY DURATION SCORE   Sitting to Supine No increase- \"feels better\"      Supine to Left Side Lying No increase      Supine to Right Side Lying No increase      Supine to Sit 2.5 2.5 2 4.5   Left Hallpike NT      to  Sitting NT      Right Hallpike NT      to Sitting NT      Sitting Nose to Left Knee No increase dizz, increased HA      to Sitting 4 4 2 6   Sitting Nose to Right Knee No increase      to Sitting 4 4 2 6   Sitting Head Rotation (4X) 3 3 1 4   Sitting Head Flex & Ext (4X) 2 2 0 2   Standing  360° Turn to Right 1.5 1.5 0 1.5   Standing 360° Turn to Left 3.5 3.5 1 4.5     Intensity: Scale 0-5 (0= no symptoms and 5= severe symptoms)  Adjusted Intensity: Intensity Level - Baseline Level  Duration: Scale from 0 to 3 for return to baseline (5-10”=1, 11-30”=2, >  30”=3)  Score: Adjusted Intensity + Duration  MOTION SENSITIVITY QUOTIENT % = # Provoking Positions X Total Score   X 100                                                                                         "                  2048   *If < 16 positions are tested, 2048 is replaced with 8 X (# of positions tested)2

## 2022-06-29 ENCOUNTER — HOSPITAL ENCOUNTER (OUTPATIENT)
Dept: OCCUPATIONAL THERAPY | Age: 42
Setting detail: THERAPIES SERIES
Discharge: HOME | End: 2022-06-29
Attending: FAMILY MEDICINE
Payer: COMMERCIAL

## 2022-06-29 DIAGNOSIS — S06.0X0A CONCUSSION WITHOUT LOSS OF CONSCIOUSNESS, INITIAL ENCOUNTER: Primary | ICD-10-CM

## 2022-06-29 PROCEDURE — 97112 NEUROMUSCULAR REEDUCATION: CPT | Mod: GO

## 2022-06-29 PROCEDURE — 97535 SELF CARE MNGMENT TRAINING: CPT | Mod: GO

## 2022-06-29 NOTE — OP OT TREATMENT LOG
"VISION/CONCUSSION OT FLOW SHEET    OT Vision/mTBI  EXERCISES CURRENT SESSION TIME   NEURO RE-ED  CPT 51528 TOTAL TIME FOR SESSION 38-52 Minutes   Initial Evaluation 06/14/22 - Completed the initial evaluation - metrics to be completed as symptoms allow - unable to complete today secondary to strong headache and fatigue.    Dynavision 6/27/20  D2 divided attention, reaction timing, and task endurance trials as follows:          Program                         Score               Avg Reaction             B 5 sec                           87                       0.68 sec   3 sec 30% Green full board   65R/29G          0.65R & 0.61 G  Fair overall tolerance with a mild increase in symptoms during the activity - 2/10 dizziness and headache  - 1/10 with short rest         6/20/22  D2 divided attention, reaction timing, and task endurance trials as follows:          Program                         Score               Avg Reaction             B 5 sec                          79                       0.75 sec      Poor overall tolerance with an increase in symptoms during the activity - 4/10 Headache and fatigue -  Rest required         VTS3/VTS4     CPT     BITs     NVR     Saccadic Fixation 06/29/22  Pt read (in dim light with glasses) a one page paper based article \"Liquid Light\" followed by a discussion of the basic content. Patient was able to tolerate the reading but would have been more comfortable with bigger print. She demonstrated poor overall memory - only approx 20% accurately. Patient was dismayed with her inability to remember. Elevated symptoms and fatigue reported especially during the discussion of the content.    06/27/22  Patient completed a close focus visual scanning and discrimination activity using the \"Stepping Stones\" challenge. She needed two cues to complete -she did not demonstrate good attention to details - she became verbally frustrated - could not find the envelope - 2/10 " "dizziness.    06/20/22 - completed saccades measures - refer to \"vision\" section attached for scores    Ocular Motor Control     Visual Tracing     3D Tracking 6/29/22  Seated tracking activity with \"Capture and Control\" using fabric rackets and a small blue spike ball - pt able to engage for approx 5 tosses then symptoms elevated - stopped activity - provided rest - this was the end of the session.    6/27/22  Seated tracking activity with \"Capture and Control\" using fabric rackets and a small blue spike ball - pt able to engage for approx 5 tosses then symptoms elevated - stopped activity - provided rest - this was the end of the session.    Visual Perception     Convergence/Divergence     Accommodation     Visual Stimulation     THER ACT  CPT 28616 TOTAL TIME FOR SESSION 0-7 Minutes   Pain, Vitals, Meds, Etc. Reviewed Reviewed   HEP 06/23/22  Reviewed and additionally explained and practiced the following home exercises to be completed every other day once AM and once PM using the \"1 to 10 functional concusion symptom scale\" as a guide to intensity  1) Level 1 horizontal saccades (needed quadrant cover and blue overlay to better tolerate all exercises provided today)  2) Level 1 vertical saccades  3) Leval 1 Tracing  4) Pencil Push Ups      Visual Endurance      Work/School Simulation      SELF CARE  CPT 29866 TOTAL TIME FOR SESSION 8-22 Minutes   PCS Symptom Management/Education 6/29/22  Patient encouraged to slow her daily activities down, have an escape plan, and inform family of temporary limitations to manage expectations - she agreed  Reviewed the use of \"1 to 10 functional concussion symptom scale\" and energy and symptom management techniques and strategies for exercises and daily routine especially as a result of her episode of severe symptoms on 6/21/22. Specifically discussed use of strategies to delegate some duties temporarily during recovery. Patient demonstrated good verbal understanding of the " information and states will try to apply this approach in daily life. Provided information and practiced the use of blue overlays.        ADL/IADLs

## 2022-06-30 ENCOUNTER — HOSPITAL ENCOUNTER (OUTPATIENT)
Dept: PHYSICAL THERAPY | Age: 42
Setting detail: THERAPIES SERIES
Discharge: HOME | End: 2022-06-30
Attending: FAMILY MEDICINE
Payer: COMMERCIAL

## 2022-06-30 DIAGNOSIS — S06.0X0A CONCUSSION WITHOUT LOSS OF CONSCIOUSNESS, INITIAL ENCOUNTER: Primary | ICD-10-CM

## 2022-06-30 PROCEDURE — 97112 NEUROMUSCULAR REEDUCATION: CPT | Mod: GP

## 2022-06-30 PROCEDURE — 97110 THERAPEUTIC EXERCISES: CPT | Mod: GP

## 2022-06-30 NOTE — PATIENT INSTRUCTIONS
"General Safety Tip Card        1.For safety, all exercises must be performed close to a support (wall, countertop, person, etc.) or in a corner with back of chair in front.  2.Only perform those exercises as instructed by the therapist. If instructions are not clearly understood, wait for clarification by therapist before attempting to perform.    Feet Heel-Toe \"Tandem\", Varied Arm Positions - Eyes Open        With eyes open, right foot directly in front of the other, arms out, look straight ahead at a stationary object. Hold __30__ seconds.  Repeat ___1_ times per session. Do _1___ sessions per day.    Copyright © [employer]. All rights reserved.     Feet Partial Heel-Toe, Varied Arm Positions - Eyes Closed        Stand with right foot partially in front of the other and arms out. Close eyes and visualize upright position. Hold ___30_ seconds.  Repeat __1__ times per session. Do __1__ sessions per day.    Copyright © [employer]. All rights reserved.     Visuo-Vestibular: Head / Eyes Moving in Same Direction        Holding a target, keep eyes on target and slowly move target, head, and eyes in SAME direction side to side for __10x or 30__ seconds. Perform walking in lower lit and min cluttered hallway.  Repeat _2_ times per session. Do __1-2__ sessions per day. Copyright © [employer]. All rights reserved.   "
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
## 2022-06-30 NOTE — OP PT TREATMENT LOG
"VESTIBULAR PT FLOWSHEET    P.T. TREATMENT LOG   Precautions:    Eval Date: 6-15-22 Progress Note:    POC expires: 9-13-22 Insurance Limits:   Treatment Current Session Time   CANALITH  REPOSITIONING TREATMENT  (CPT 84896) Y/N Notes Not performed   CRT      NEURO RE-ED  (CPT 82052) Y/N Notes 38-52 Minutes   BPPV ASSESSMENT N No recent c/o vertigo   VOMS  See objective section   VOR CANCELLATION                      Standing H/VVOR-C             y 6-21-22: FA with 1 inch B hand held:  - HVORC toward windows of gym X 45\"- 3/10 dizziness  FA with 1 inch B on reacher facing grey wall:  - VVORC X 1 minute- no dizziness    6-30-22 FA ; B on extender with hallway as background, 1\" B held at arms length, x30\"  (10x)  -HVOR-C:  no  disorientation, -    vvorcx   Same;   Cues to move head and not smooth pursuit     Ambulation w/ H/VVOR-C y [place] hallway; b on extender; no increase in symptoms; mild disequibrium  hvorc and vvorc   VOR / GAZE STABILITY     Standing H/VVOR           y       y 6-21-22: Standing with FA and 6 feet from 1 inch B on wall:  -HVOR X 1 at SSV X 30\"- no increase/ fatigued  -VVOR X 1 at SSV X 30\"- no increase/ fatigue    5' from 1\" B on grey background  -HVORx1: 80 bpm x 60\" - no sxs elicited    trialed lower lighting; no glare; 6'--able to tolerate 85 bpm without symptoms and ft      90 bpm x 60\" - mild blurring ~ 1/2 way through, slight disorientation  100 bpm x 60\" - \"slight disorientation\", 2 minutes seated for full resolution    -VVORx1: 90 bpm x 60\" - blurring at 25 sec, one minute for full resolution, 5 sec for resolution  100 bpm x 60\" - double-vision, \"haze around B\", 2 minutes seated for full resolution         Ambulation w/H/VVOR     H/VVOR on compliant surfaces     Functional VOR     HABITUATION     Ball circles     Repetitive functional movements y 180 turns x 4   Dizziness  2.5/10   BALANCE- STATIC     On floor y Sr/eo  30 sec    Mod sr  /ec   30 sec   Airex foam NV    Rockerboard NV  " "  BALANCE- DYNAMIC     Ambulation-head turns/nods     Ambulation - 180 & 360 degree turns     Retro ambulation EO     Ambulation with EC     Obstacles     Tandem     OKS     MSQ  SOT  FGA  DVA        6-21-22:17%   6-21-22: 85%    See objective section   THER-EX   (CPT 44071) Y/N SETS REPS LOAD 8-22 Minutes   CARDIOVASCULAR     Nustep  L1 x10 min, 70-80 spm, small towel to cover screen, mild fatigue at end of 10 min   TM    Max hr   180  50%   90  60%   108  70%  126  80%   144  90%   162 y 10' up to 60% max hr without symptoms; o2 sats wnl   THER ACT  (CPT 58508) Y/N  8-22 Minutes   Review pain, meds, falls, vitals, symptoms Y    *Subjective Measures   ABC  DHI        (6/15) 86%  (6/15) 44/100   Patient Education/HEP                    (6/15) Patient educated on function of vestibular system, sensory integration, PCS, results of objective testing, POC going forward. Verbalizes understanding.   6-21-22: Issued HEP as follows with patient demonstrating proper technique in clinic and verbalizing understanding: VORC in standing, VOR X 1 in standing.    (6/28) Downloaded metronome lion and discussed with patient appropriate use, dosing, and progression. Demonstrates good understanding.     6-30  HEP issued/updated/reviewed.  Patient demonstrated good understanding of safety precautions and performance of exercises.  Static standing and vor cx while walking added to current program.  With VOR, pt to try to work with lighting and color of target to reduce glare.  Pt to work up to 85 bpm for 60 sec intervals with FT. Pt scheduled to see opthalmologist       Motion Sensitivity Quotient   DATE:   6-21-22                 Baseline Level (0-5):     0/5               MSQ Score:17%    POSITION INTENSITY ADJUSTED  INTENSITY DURATION SCORE   Sitting to Supine No increase- \"feels better\"      Supine to Left Side Lying No increase      Supine to Right Side Lying No increase      Supine to Sit 2.5 2.5 2 4.5   Left Hallpike NT      to  " Sitting NT      Right Hallpike NT      to Sitting NT      Sitting Nose to Left Knee No increase dizz, increased HA      to Sitting 4 4 2 6   Sitting Nose to Right Knee No increase      to Sitting 4 4 2 6   Sitting Head Rotation (4X) 3 3 1 4   Sitting Head Flex & Ext (4X) 2 2 0 2   Standing  360° Turn to Right 1.5 1.5 0 1.5   Standing 360° Turn to Left 3.5 3.5 1 4.5     Intensity: Scale 0-5 (0= no symptoms and 5= severe symptoms)  Adjusted Intensity: Intensity Level - Baseline Level  Duration: Scale from 0 to 3 for return to baseline (5-10”=1, 11-30”=2, >  30”=3)  Score: Adjusted Intensity + Duration  MOTION SENSITIVITY QUOTIENT % = # Provoking Positions X Total Score   X 100                                                                                                          2048   *If < 16 positions are tested, 2048 is replaced with 8 X (# of positions tested)2
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
## 2022-07-12 ENCOUNTER — HOSPITAL ENCOUNTER (OUTPATIENT)
Dept: PHYSICAL THERAPY | Age: 42
Setting detail: THERAPIES SERIES
Discharge: HOME | End: 2022-07-12
Attending: FAMILY MEDICINE
Payer: COMMERCIAL

## 2022-07-12 DIAGNOSIS — S06.0X0A CONCUSSION WITHOUT LOSS OF CONSCIOUSNESS, INITIAL ENCOUNTER: Primary | ICD-10-CM

## 2022-07-12 PROCEDURE — 97112 NEUROMUSCULAR REEDUCATION: CPT | Mod: GP

## 2022-07-12 PROCEDURE — 97110 THERAPEUTIC EXERCISES: CPT | Mod: GP
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
## 2022-07-12 NOTE — OP PT TREATMENT LOG
"VESTIBULAR PT FLOWSHEET    P.T. TREATMENT LOG   Precautions:    Eval Date: 6-15-22 Progress Note:    POC expires: 9-13-22 Insurance Limits:   Treatment Current Session Time   CANALITH  REPOSITIONING TREATMENT  (CPT 68836) Y/N Notes Not performed   CRT      NEURO RE-ED  (CPT 24611) Y/N Notes 38-52 Minutes   BPPV ASSESSMENT N No recent c/o vertigo   VOMS  See objective section   VOR CANCELLATION                      Standing H/VVOR-C              6-21-22: FA with 1 inch B hand held:  - HVORC toward windows of gym X 45\"- 3/10 dizziness  FA with 1 inch B on reacher facing grey wall:  - VVORC X 1 minute- no dizziness    6-30-22 FA ; B on extender with hallway as background, 1\" B held at arms length, x30\"  (10x)  -HVOR-C:  no  disorientation, -    vvorcx   Same;   Cues to move head and not smooth pursuit     Ambulation w/ H/VVOR-C y [place] hallway; b on extender; no increase in symptoms; mild disequibrium  hvorc and vvorc   VOR / GAZE STABILITY     Standing H/VVOR           y        6-21-22: Standing with FA and 6 feet from 1 inch B on wall:  -HVOR X 1 at SSV X 30\"- no increase/ fatigued  -VVOR X 1 at SSV X 30\"- no increase/ fatigue    5' from 1\" B on grey background, dark room  - HVORx1: 85 bpm x 60\" - patient reporting \"floating white B\" at 40s, resolves immediately upon end of set, no other sxs  - VVORx1: 85 bpm x 60\" - patient reporting \"floating white B\" at 56s, resolves immediately upon end of set, no other sxs  - HVORx1: 90 bpm x 60\" - patient reporting \"floating white B\" at 37s, 5/10 dizziness  - VVORx1: 8 bpm x 30\" - patient reporting \"symptoms are about to start\", but no explicit dizziness  - HVORx1: 90 bpm x 30\" - patient reporting \"floating white B\" at 37s, 5/10 dizziness            Ambulation w/H/VVOR     H/VVOR on compliant surfaces     Functional VOR     HABITUATION     Ball circles     Repetitive functional movements  180 turns x 4   Dizziness  2.5/10   BALANCE- STATIC     On floor  Sr/eo  30 sec    Mod sr  " "/ec   30 sec   Airex foam Y HTs x10, min-mod unsteady, no sxs  HNs x10, min unsteady, no sxs  EC 3x30\", min-mod unsteady, no sxs   Rockerboard NV    BALANCE- DYNAMIC     Ambulation-head turns/nods     Ambulation - 180 & 360 degree turns     Retro ambulation EO     Ambulation with EC     Obstacles     Tandem     OKS     MSQ  SOT  FGA  DVA        6-21-22:17%   6-21-22: 85%    See objective section   THER-EX   (CPT 04980) Y/N SETS REPS LOAD 8-22 Minutes   CARDIOVASCULAR     Nustep  L1 x10 min, 70-80 spm, small towel to cover screen, mild fatigue at end of 10 min   TM    Max hr   180  50%   90  60%   108  70%  126  80%   144  90%   162 y 2.5 mph x 10 min, HR up to 116 bpm, HA intensity decrease from 3 to 2/10   THER ACT  (CPT 61001) Y/N  0-7 Minutes   Review pain, meds, falls, vitals, symptoms Y    *Subjective Measures   ABC  DHI        (6/15) 86%  (6/15) 44/100   Patient Education/HEP                    (6/15) Patient educated on function of vestibular system, sensory integration, PCS, results of objective testing, POC going forward. Verbalizes understanding.   6-21-22: Issued HEP as follows with patient demonstrating proper technique in clinic and verbalizing understanding: VORC in standing, VOR X 1 in standing.    (6/28) Downloaded metronome lion and discussed with patient appropriate use, dosing, and progression. Demonstrates good understanding.     6-30  HEP issued/updated/reviewed.  Patient demonstrated good understanding of safety precautions and performance of exercises.  Static standing and vor cx while walking added to current program.  With VOR, pt to try to work with lighting and color of target to reduce glare.  Pt to work up to 85 bpm for 60 sec intervals with FT. Pt scheduled to see ophthalmologist    (7/12) Benefits of mild intensity CV activity in mitigating symptoms and progressing rehab       Motion Sensitivity Quotient   DATE:   6-21-22                 Baseline Level (0-5):     0/5               MSQ " "Score:17%    POSITION INTENSITY ADJUSTED  INTENSITY DURATION SCORE   Sitting to Supine No increase- \"feels better\"      Supine to Left Side Lying No increase      Supine to Right Side Lying No increase      Supine to Sit 2.5 2.5 2 4.5   Left Hallpike NT      to  Sitting NT      Right Hallpike NT      to Sitting NT      Sitting Nose to Left Knee No increase dizz, increased HA      to Sitting 4 4 2 6   Sitting Nose to Right Knee No increase      to Sitting 4 4 2 6   Sitting Head Rotation (4X) 3 3 1 4   Sitting Head Flex & Ext (4X) 2 2 0 2   Standing  360° Turn to Right 1.5 1.5 0 1.5   Standing 360° Turn to Left 3.5 3.5 1 4.5     Intensity: Scale 0-5 (0= no symptoms and 5= severe symptoms)  Adjusted Intensity: Intensity Level - Baseline Level  Duration: Scale from 0 to 3 for return to baseline (5-10”=1, 11-30”=2, >  30”=3)  Score: Adjusted Intensity + Duration  MOTION SENSITIVITY QUOTIENT % = # Provoking Positions X Total Score   X 100                                                                                                          2048   *If < 16 positions are tested, 2048 is replaced with 8 X (# of positions tested)2          "

## 2022-07-14 ENCOUNTER — HOSPITAL ENCOUNTER (OUTPATIENT)
Dept: PHYSICAL THERAPY | Age: 42
Setting detail: THERAPIES SERIES
Discharge: HOME | End: 2022-07-14
Attending: FAMILY MEDICINE
Payer: COMMERCIAL

## 2022-07-14 DIAGNOSIS — S06.0X0A CONCUSSION WITHOUT LOSS OF CONSCIOUSNESS, INITIAL ENCOUNTER: Primary | ICD-10-CM

## 2022-07-14 PROCEDURE — 97110 THERAPEUTIC EXERCISES: CPT | Mod: GP

## 2022-07-14 PROCEDURE — 97530 THERAPEUTIC ACTIVITIES: CPT | Mod: GP

## 2022-07-14 PROCEDURE — 97112 NEUROMUSCULAR REEDUCATION: CPT | Mod: GP

## 2022-07-14 NOTE — OP PT TREATMENT LOG
"VESTIBULAR PT FLOWSHEET    P.T. TREATMENT LOG   Precautions:    Eval Date: 6-15-22 Progress Note:    POC expires: 9-13-22 Insurance Limits:   Treatment Current Session Time   CANALITH  REPOSITIONING TREATMENT  (CPT 26052) Y/N Notes Not performed   CRT      NEURO RE-ED  (CPT 47330) Y/N Notes 23-37 Minutes   BPPV ASSESSMENT N No recent c/o vertigo   VOMS  See objective section   VOR CANCELLATION                      Standing H/VVOR-C                           Y 6-21-22: FA with 1 inch B hand held:  - HVORC toward windows of gym X 45\"- 3/10 dizziness  FA with 1 inch B on reacher facing grey wall:  - VVORC X 1 minute- no dizziness    6-30-22 FA ; B on extender with hallway as background, 1\" B held at arms length, x30\"  (10x)  -HVOR-C:  no  disorientation, -    vvorcx   Same;   Cues to move head and not smooth pursuit    7-14-22 FA on airex, 1\"B held at arm's length, 30\" ea  HVOR-C: mild unsteadiness at 25\", immediate resolution upon completion  VVOR-C: mild unsteadiness upon stopping, no sxs during, immediate resolution     Ambulation w/ H/VVOR-C  [place] hallway; b on extender; no increase in symptoms; mild disequibrium  hvorc and vvorc   VOR / GAZE STABILITY     Standing H/VVOR                  6-21-22: Standing with FA and 6 feet from 1 inch B on wall:  -HVOR X 1 at SSV X 30\"- no increase/ fatigued  -VVOR X 1 at SSV X 30\"- no increase/ fatigue    5' from 1\" B on grey background, dark room  - HVORx1: 85 bpm x 60\" - patient reporting \"floating white B\" at 40s, resolves immediately upon end of set, no other sxs  - VVORx1: 85 bpm x 60\" - patient reporting \"floating white B\" at 56s, resolves immediately upon end of set, no other sxs  - HVORx1: 90 bpm x 60\" - patient reporting \"floating white B\" at 37s, 5/10 dizziness  - VVORx1: 8 bpm x 30\" - patient reporting \"symptoms are about to start\", but no explicit dizziness  - HVORx1: 90 bpm x 30\" - patient reporting \"floating white B\" at 37s, 5/10 dizziness            Ambulation " "w/H/VVOR     H/VVOR on compliant surfaces     Functional VOR     HABITUATION     Ball circles     Repetitive functional movements         Y                Y        Y 180 turns x 4   Dizziness  2.5/10        Supine > Sit  -Left  Trial 1: 1.5/5, immediate resolution  Trial 2: 1.0/5, \"        \"  Trial 3: 0/5  Trial 4: 0/5  Trial 5: 0/5    Fwd bend > Upright  Trial 1: 1/5, immediate resolution  Trial 2-5: 0/5    Quarter Turns (3 full turns per set)  L: 0.5 2/12 reps, remainder no symptoms  R: 2/5 dizziness with 6/12 reps, increased HA, seated rest break  R: 2nd with 5 sec rest intervals: no sxs     BALANCE- STATIC     On floor  Sr/eo  30 sec    Mod sr  /ec   30 sec   Airex foam     Y  Y HTs x10, min-mod unsteady, no sxs  HNs x10, min unsteady, no sxs  EC x30\", min unsteady, no sxs  EC + HTs x30\", mod unsteady, hip strategy intermittently    Rockerboard NV    BALANCE- DYNAMIC     Ambulation-head turns/nods     Ambulation - 180 & 360 degree turns     Retro ambulation EO     Ambulation with EC     Obstacles     Tandem     OKS     MSQ      SOT  FGA  DVA   Y     6-21-22:17%   (7/4) 1.2%    6-21-22: 85%    See objective section   THER-EX   (CPT 85866) Y/N SETS REPS LOAD 8-22 Minutes   CARDIOVASCULAR     Nustep  L1 x10 min, 70-80 spm, small towel to cover screen, mild fatigue at end of 10 min   TM    Max hr   180  50%   90  60%   108  70%  126  80%   144  90%   162 y 3.1 mph x 13 min, HR up to 114 bpm, HA intensity decrease from 7 to 3/10   THER ACT  (CPT 95966) Y/N  8-22 Minutes   Review pain, meds, falls, vitals, symptoms Y    *Subjective Measures   ABC      DHI        (6/15) 86%  (7/14) 89%    (6/15) 44/100  (7/14) 38/100   Patient Education/HEP   Y                 (6/15) Patient educated on function of vestibular system, sensory integration, PCS, results of objective testing, POC going forward. Verbalizes understanding.   6-21-22: Issued HEP as follows with patient demonstrating proper technique in clinic and verbalizing " "understanding: VORC in standing, VOR X 1 in standing.    (6/28) Downloaded metronome lion and discussed with patient appropriate use, dosing, and progression. Demonstrates good understanding.     6-30  HEP issued/updated/reviewed.  Patient demonstrated good understanding of safety precautions and performance of exercises.  Static standing and vor cx while walking added to current program.  With VOR, pt to try to work with lighting and color of target to reduce glare.  Pt to work up to 85 bpm for 60 sec intervals with FT. Pt scheduled to see ophthalmologist    (7/12) Benefits of mild intensity CV activity in mitigating symptoms and progressing rehab    (7/14) Subjective/objective outcome measures, progress to date, POC going forward       Motion Sensitivity Quotient   DATE:   7-14-22                 Baseline Level (0-5):     0/5               MSQ Score:8%    POSITION INTENSITY ADJUSTED  INTENSITY DURATION SCORE   Sitting to Supine No increase- \"feels better\"      Supine to Left Side Lying No increase      Supine to Right Side Lying No increase      Supine to Sit 1.5 1.5 0 1.5   Left Hallpike NT      to  Sitting NT      Right Hallpike NT      to Sitting NT      Sitting Nose to Left Knee No increase dizz      to Sitting 1.5 1.5 0 1.5   Sitting Nose to Right Knee No increase      to Sitting 0.5 0.5 0 0.5   Sitting Head Rotation (4X) 3.5 3.5 1 4.5   Sitting Head Flex & Ext (4X) 1.5 1.5 0 1.5   Standing  360° Turn to Right 3.0 3.0 0 3.0   Standing 360° Turn to Left 2.0 2.0 0 2.0     Intensity: Scale 0-5 (0= no symptoms and 5= severe symptoms)  Adjusted Intensity: Intensity Level - Baseline Level  Duration: Scale from 0 to 3 for return to baseline (5-10”=1, 11-30”=2, >  30”=3)  Score: Adjusted Intensity + Duration  MOTION SENSITIVITY QUOTIENT % = # Provoking Positions X Total Score   X 100                                                                                                          2048   *If < 16 positions are " tested, 2048 is replaced with 8 X (# of positions tested)2
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
## 2022-07-15 ENCOUNTER — HOSPITAL ENCOUNTER (OUTPATIENT)
Dept: OCCUPATIONAL THERAPY | Age: 42
Setting detail: THERAPIES SERIES
Discharge: HOME | End: 2022-07-15
Attending: FAMILY MEDICINE
Payer: COMMERCIAL

## 2022-07-15 DIAGNOSIS — S06.0X0A CONCUSSION WITHOUT LOSS OF CONSCIOUSNESS, INITIAL ENCOUNTER: Primary | ICD-10-CM

## 2022-07-15 PROCEDURE — 97112 NEUROMUSCULAR REEDUCATION: CPT | Mod: GO

## 2022-07-15 PROCEDURE — 97535 SELF CARE MNGMENT TRAINING: CPT | Mod: GO

## 2022-07-18 ENCOUNTER — HOSPITAL ENCOUNTER (OUTPATIENT)
Dept: OCCUPATIONAL THERAPY | Age: 42
Setting detail: THERAPIES SERIES
Discharge: HOME | End: 2022-07-18
Attending: FAMILY MEDICINE
Payer: COMMERCIAL

## 2022-07-18 DIAGNOSIS — S06.0X0A CONCUSSION WITHOUT LOSS OF CONSCIOUSNESS, INITIAL ENCOUNTER: Primary | ICD-10-CM

## 2022-07-18 PROCEDURE — 97112 NEUROMUSCULAR REEDUCATION: CPT | Mod: GO

## 2022-07-18 PROCEDURE — 97535 SELF CARE MNGMENT TRAINING: CPT | Mod: GO

## 2022-07-18 NOTE — PROGRESS NOTES
OT PROGRESS NOTE FOR OUTPATIENT THERAPY    Patient: Lamar Sommers MRN: [id number]  : 1980 42 y.o.  Referring Physician: Geno Emmanuel DO  Date of Visit: 7/15/2022      Certification Dates:   22 through 22    Recommended Frequency & Duration:  2 times/week for up to 3 months        Diagnosis:   1. Concussion without loss of consciousness, initial encounter        Chief Complaints:  Chief Complaint   Patient presents with   • Visual Deficits   • Decreased Endurance   • Decreased recreational/play activity   •  Difficulty Performing Work/school Tasks       Precautions:  Precautions comments: Hx of Moris-Danlos syndrome, cervical herniated disc, hx of Right LE rotational osteotomy age 22, asthma with inhaler PRN  Existing Precautions/Restrictions: other (see comments)    TODAY'S VISIT:    Time In Session:  Start Time: 905  Stop Time:   Time Calculation (min): 55 min   General Information - 07/15/22 0919        General Information    Document Type progress note     Limitations/Impairments visual     Existing Precautions/Restrictions other (see comments)     Precautions comments Hx of Moris-Danlos syndrome, cervical herniated disc, hx of Right LE rotational osteotomy age 22, asthma with inhaler PRN                Daily Falls Screen - 07/15/22 0910        Daily Falls Assessment    Patient reported fall since last visit No                Pain/Vitals - 07/15/22 0919        Pain Assessment    Currently in pain Yes     Preferred Pain Scale number (Numeric Rating Pain Scale)     Pain: Body location Head     Pain Rating (0-10): Pre Activity 2     Pain Rating (0-10): Activity 3     Pain Rating (0-10): Post Activity 4   increased H/A and fatigue with testing       Pain Interventions    Intervention  Monitored throughout - rest breaks as needed     Post Intervention Comments Moderate increase in symptom severity- pt remains easily provoked to stronger symptoms                OT - 07/15/22 0919      "   Occupational Therapy Encounter Type Details    Document Type progress note                   OBJECTIVE MEASUREMENTS/DATA:    Vision     Vision - 07/15/22 0919        Vision Assessment    Visual Impairment/Limitations corrective lenses full-time   has an optometry appointment end of July - [facility] on [place]    Visual Motor Impairment accommodation;contrast sensitivity;convergence, left;convergence, right;saccades     Impact of Vision Impairment on Function Patient unable to engage in close focus tasks for productive periods of time without experiencing severe symptoms - computer 15 min and paper 30 min        Oculomotor Control    Left eye assessed? Yes     Right eye assessed? Yes     Superior assessed? Yes     Inferior assessed? Yes     Diagonal assessed? Yes     Circular assessed? Yes   smoother than eval    Left AROM without target ROM Intact     Left oculomotor AROM Discomfort None     Left gaze fixation (10 second hold) Able      Right AROM without target ROM Intact      Right oculomotor AROM Discomfort None     Right gaze fixation (10 second hold) Able     Superior AROM without target ROM Intact      Superior oculomotor AROM Discomfort None     Superior gaze fixation (10 second hold) Able     Inferior AROM without target ROM Intact     Inferior oculomotor AROM Discomfort None     Inferior gaze fixation (10 second hold) Able     Diagonal AROM without target ROM Intact     Diagonal oculomotor AROM Discomfort None     Diagonal gaze fixation (10 second hold) Able     Circular AROM without target ROM Intact     Circular oculomotor AROM Discomfort None     Circular gaze fixation (10 second hold) --   n/a       Eye Teaming    Convergence Impaired   approx 18\"    Divergence Impaired   delayed    Smooth Pursuits Impaired   provoked H/A    3D Tracking Impaired   early fatigue with symptoms    Nystagmus Yes   mild - worse right       Saccadic Fixation Speed    Vertical Saccadic Fixation WFL     Horizontal Saccadic " Fixation WFL     Horizontal Saccades H1 5.15 Seconds     Horizontal Saccades H2 5.19 Seconds     Horizontal Saccades H3 5.09 Seconds     Horizontal Saccades H4 5.16 Seconds     Horizontal Saccades trials average 5.15 Seconds     Vertical Saccades V1 3.77 Seconds     Vertical Saccades V2 3.64 Seconds     Vertical Saccades V3 4.32 Seconds     Vertical Saccades V4 3.82 Seconds     Vertical Saccades trials average 3.89 Seconds     Jovanny Devick Test #1 (seconds) 12.86 Seconds     Jovanny Devick Test #2 (seconds) 13.62 Seconds     Jovanny Devick Test #3 (seconds) 13.42 Seconds     Jovanny Devick Total Time 39.9 Seconds     Comments Now WNL speed but provoked increased headache and greater fatigue     Jovanny Devick Errors #1 0     Jovanny Devick Errors #2 0     Jovanny Devick Errors #3 0     Jovanny Devick Total Errors 0        Visual Reaction Timing    Dynavision reaction time within normal limits but one Dynavision trial can cause elevated symptoms - poor overall endurance.     Dynavision Score (Mode B, 5 sec light timer) Targets 87     Dynavision Score Avg response time 0.68 Seconds     Dynavision Score Overall WFL        Symptoms and Outcome Measures    Symptoms Anxiety;Cognitive changes;Cognitive Fatigue;Difficulty reading;Difficulty using computer;Difficulty watching TV;Dizziness;Fatigue;Fogginess;Headache;Irritability;Imbalance;Multi-stimulus intolerance;Nausea;Photophobia;Sleep disturbance;Slowed processing;Visual changes     [place] score = 39/64 - increased since last measure but pt is doing more at work and homeand therapy     Symptoms/Comments also moderate hypersensitivity to visual clutter and motion remains an issue during her daily routine                   OT Outcome Measures    OT OBJECTIVE Outcome Measures 6/14/22 6/20/22 7/15/22   Jovanny Devick Total Time  51.83 Seconds 39.9 Seconds   [employer] Total Errors  0 0   Dynavision - Targets   87   Dynavision - Seconds   0.68 Seconds   Dynavision - Impression   WFL      OT  "SUBJECTIVE Outcome Measures 6/14/22 6/20/22 7/15/22   [place] score = 26/64    also moderate hypersensitivity to visual clutter and motion  score = 39/64 - increased since last measure but pt is doing more at work and homeand therapy             Today's Treatment::    Education provided:  Reviewed energy and symptom management techniques and strategies. Patient demonstrated good verbal understanding of the information and states she is trying to apply this approach in her daily life.      Today's Treatment:     VISION/CONCUSSION OT FLOW SHEET     OT Vision/mTBI  EXERCISES CURRENT SESSION TIME   NEURO RE-ED  CPT 92281 TOTAL TIME FOR SESSION 38-52 Minutes   Progress Note    Initial Evaluation 07/15/22 - Completed the  #1 progress note - refer to details and metrics attached    06/14/22 - Completed the initial evaluation - metrics to be completed as symptoms allow - unable to complete today secondary to strong headache and fatigue.     Dynavision 6/27/20  D2 divided attention, reaction timing, and task endurance trials as follows:          Program                         Score               Avg Reaction             B 5 sec                           87                       0.68 sec   3 sec 30% Green full board   65R/29G          0.65R & 0.61 G  Fair overall tolerance with a mild increase in symptoms during the activity - 2/10 dizziness and headache  - 1/10 with short rest            6/20/22  D2 divided attention, reaction timing, and task endurance trials as follows:          Program                         Score               Avg Reaction             B 5 sec                          79                       0.75 sec      Poor overall tolerance with an increase in symptoms during the activity - 4/10 Headache and fatigue -  Rest required            VTS3/VTS4       CPT       BITs       NVR       Saccadic Fixation 06/29/22  Pt read (in dim light with glasses) a one page paper based article \"Liquid Light\" followed by a " "discussion of the basic content. Patient was able to tolerate the reading but would have been more comfortable with bigger print. She demonstrated poor overall memory - only approx 20% accurately. Patient was dismayed with her inability to remember. Elevated symptoms and fatigue reported especially during the discussion of the content.     06/27/22  Patient completed a close focus visual scanning and discrimination activity using the \"Stepping Stones\" challenge. She needed two cues to complete -she did not demonstrate good attention to details - she became verbally frustrated - could not find the envelope - 2/10 dizziness.     06/20/22 - completed saccades measures - refer to \"vision\" section attached for scores     Ocular Motor Control       Visual Tracing       3D Tracking 6/29/22  Seated tracking activity with \"Capture and Control\" using fabric rackets and a small blue spike ball - pt able to engage for approx 5 tosses then symptoms elevated - stopped activity - provided rest - this was the end of the session.     6/27/22  Seated tracking activity with \"Capture and Control\" using fabric rackets and a small blue spike ball - pt able to engage for approx 5 tosses then symptoms elevated - stopped activity - provided rest - this was the end of the session.     Visual Perception       Convergence/Divergence       Accommodation       Visual Stimulation       THER ACT  CPT 67916 TOTAL TIME FOR SESSION 0-7 Minutes   Pain, Vitals, Meds, Etc. Reviewed Reviewed   HEP 06/23/22  Reviewed and additionally explained and practiced the following home exercises to be completed every other day once AM and once PM using the \"1 to 10 functional concusion symptom scale\" as a guide to intensity  1) Level 1 horizontal saccades (needed quadrant cover and blue overlay to better tolerate all exercises provided today)  2) Level 1 vertical saccades  3) Leval 1 Tracing  4) Pencil Push Ups        Visual Endurance        Work/School Simulation  " "      SELF CARE  CPT 59338 TOTAL TIME FOR SESSION 8-22 Minutes   PCS Symptom Management/Education 7/15/22  Reinforced that patient needs to slow down her daily activities and pace, have an escape plan, and inform family of temporary limitations to manage expectations - she agreed  Reviewed the use of \"1 to 10 functional concussion symptom scale\" and energy and symptom management techniques and strategies for exercises and daily routine especially as a result of her episode of severe symptoms on 6/21/22. Specifically discussed use of strategies to delegate some duties temporarily during recovery. Patient demonstrated good verbal understanding of the information and states will try to apply this approach in daily life. Provided information and practiced the use of blue overlays.           ADL/IADLs                             Goals Addressed                 This Visit's Progress    • OT Concussion/TBI STGs and LTGs        Short Term Goals  Time Frame Result Comment   Full ocular motor range of motion and control with mild PCS symptoms 4 weeks  Met  As of progress note on 7/15/22   Convergence less than or equal to 7 inches for near-focus tasks of reading and computer use with mild symptoms 4 weeks  Not Met  Approx 16\" to 18\"   Fair tolerance for normal volume and intensity of visual stimulation with mild symptoms after 20 minutes of engagement in a moderate to high multistim environment. 4 weeks  Not Met  has moderate symptoms which worsen quickl   Visual reaction time within or less than .75 seconds via Dynavision with minimal symptoms  4 weeks  Met  0.68 sec   Saccadic fixation speed of less than 57 seconds as measured by the Jovanny Devick Test with mild symptoms. 4 weeks  Met  score = 39.90 sec   Visual perceptual skills via MVPT 4 with a standard score equivalent to age range. 4 weeks Not tested      Patient will perform IADLs and community activities with decreased symptoms as evidenced by a 5 to 10 point reduction " on the [place] Post Concussion Questionaire. 4 weeks  Not met  score increased (worsened) = 39/64 but pt has been doing more   Patient will be independent with visual home exercise program. 4 weeks Met      Long Term Goals  Time Frame Result Comment   Patient will complete necessary reading for work/leisure/school, with accommodations if indicated, with absent or manageable symptoms. 12 weeks  on going  As of # 1 progress note on 07/15/22   Patient will utilize computer for work/leisure/school tasks with accommodations if indicated with absent or manageable symptoms. 12 weeks  on going     Patient will return to work/school  with full or modified duties with absent or manageable symptoms. 12 weeks  on going     Patient will perform IADLs and community activities with absent or manageable symptoms as measured by a score of equal to or less than 20 on the [place] Post Concussion Questionaire. 12  weeks  on going     Good tolerance for normal volume and intensity of visual stimulation with absent or manageable symptoms after 60  minutes of engagement in a moderate to high multistim environment. 12 weeks on going

## 2022-07-18 NOTE — OP OT TREATMENT LOG
Today's Treatment:     VISION/CONCUSSION OT FLOW SHEET     OT Vision/mTBI  EXERCISES CURRENT SESSION TIME   NEURO RE-ED  CPT 72887 TOTAL TIME FOR SESSION 38-52 Minutes   Progress Note    Initial Evaluation 07/15/22 - Completed the  #1 progress note - refer to details and metrics attached    06/14/22 - Completed the initial evaluation - metrics to be completed as symptoms allow - unable to complete today secondary to strong headache and fatigue.     Dynavision   7/18/20  D2 divided attention, reaction timing, and task endurance trials as follows:          Program                         Score               Avg Reaction             B 5 sec                           92                       0.65 sec       B 1 sec Fast                         99                       0.60   3 sec 30% Green full board   65R/28G          0.65R & 0.61 G   3 sec 30% Green full board   70R/29G          0.59R & 0.60 G  Fair overall tolerance with a mild increase in symptoms during the activity - 2/10 dizziness and headache  - 1/10 with short rest     6/27/20  D2 divided attention, reaction timing, and task endurance trials as follows:          Program                         Score               Avg Reaction             B 5 sec                           87                       0.68 sec   3 sec 30% Green full board   65R/29G          0.65R & 0.61 G  Fair overall tolerance with a mild increase in symptoms during the activity - 2/10 dizziness and headache  - 1/10 with short rest       6/20/22  D2 divided attention, reaction timing, and task endurance trials as follows:          Program                         Score               Avg Reaction             B 5 sec                          79                       0.75 sec      Poor overall tolerance with an increase in symptoms during the activity - 4/10 Headache and fatigue -  Rest required            VTS3/VTS4       CPT       BITs       NVR       Saccadic Fixation 07/18/22  Patient engaged in  "seated close focus table top scanning and visual clutter activity using the \"[employer]\" word search. She reported that it was more provoking than the Dynavision trials and it was hard to maintain eye control and attention. She required frequent breaks - approx every 30 seconds and reported an elevated H/A to 3/10.    06/29/22  Pt read (in dim light with glasses) a one page paper based article \"Liquid Light\" followed by a discussion of the basic content. Patient was able to tolerate the reading but would have been more comfortable with bigger print. She demonstrated poor overall memory - only approx 20% accurately. Patient was dismayed with her inability to remember. Elevated symptoms and fatigue reported especially during the discussion of the content.     06/27/22  Patient completed a close focus visual scanning and discrimination activity using the \"Stepping Stones\" challenge. She needed two cues to complete -she did not demonstrate good attention to details - she became verbally frustrated - could not find the envelope - 2/10 dizziness.     06/20/22 - completed saccades measures - refer to \"vision\" section attached for scores     Ocular Motor Control       Visual Tracing       3D Tracking 6/29/22  Seated tracking activity with \"Capture and Control\" using fabric rackets and a small blue spike ball - pt able to engage for approx 5 tosses then symptoms elevated - stopped activity - provided rest - this was the end of the session.     6/27/22  Seated tracking activity with \"Capture and Control\" using fabric rackets and a small blue spike ball - pt able to engage for approx 5 tosses then symptoms elevated - stopped activity - provided rest - this was the end of the session.     Visual Perception       Convergence/Divergence       Accommodation       Visual Stimulation       THER ACT  CPT 53622 TOTAL TIME FOR SESSION 0-7 Minutes   Pain, Vitals, Meds, Etc. Reviewed Reviewed   HEP 06/23/22  Reviewed and additionally " "explained and practiced the following home exercises to be completed every other day once AM and once PM using the \"1 to 10 functional concusion symptom scale\" as a guide to intensity  1) Level 1 horizontal saccades (needed quadrant cover and blue overlay to better tolerate all exercises provided today)  2) Level 1 vertical saccades  3) Leval 1 Tracing  4) Pencil Push Ups        Visual Endurance        Work/School Simulation        SELF CARE  CPT 82692 TOTAL TIME FOR SESSION 8-22 Minutes   PCS Symptom Management/Education 7/18/22  Reinforced that patient needs to slow down her daily activities and pace, have an escape plan, and inform family of temporary limitations to manage expectations - especially as it pertains to her week end plans for house settlement - she agreed.  Reviewed the use of \"1 to 10 functional concussion symptom scale\" and energy and symptom management techniques and strategies for exercises and daily routine especially as a result of her episode of severe symptoms on 6/21/22. Specifically discussed use of strategies to delegate some duties temporarily during recovery. Patient demonstrated good verbal understanding of the information and states will try to apply this approach in daily life. Provided information and practiced the use of blue overlays.           ADL/IADLs                    "

## 2022-07-18 NOTE — OP OT TREATMENT LOG
"Today's Treatment:     VISION/CONCUSSION OT FLOW SHEET     OT Vision/mTBI  EXERCISES CURRENT SESSION TIME   NEURO RE-ED  CPT 29289 TOTAL TIME FOR SESSION 38-52 Minutes   Progress Note    Initial Evaluation 07/15/22 - Completed the  #1 progress note - refer to details and metrics attached    06/14/22 - Completed the initial evaluation - metrics to be completed as symptoms allow - unable to complete today secondary to strong headache and fatigue.     Dynavision 6/27/20  D2 divided attention, reaction timing, and task endurance trials as follows:          Program                         Score               Avg Reaction             B 5 sec                           87                       0.68 sec   3 sec 30% Green full board   65R/29G          0.65R & 0.61 G  Fair overall tolerance with a mild increase in symptoms during the activity - 2/10 dizziness and headache  - 1/10 with short rest            6/20/22  D2 divided attention, reaction timing, and task endurance trials as follows:          Program                         Score               Avg Reaction             B 5 sec                          79                       0.75 sec      Poor overall tolerance with an increase in symptoms during the activity - 4/10 Headache and fatigue -  Rest required            VTS3/VTS4       CPT       BITs       NVR       Saccadic Fixation 06/29/22  Pt read (in dim light with glasses) a one page paper based article \"Liquid Light\" followed by a discussion of the basic content. Patient was able to tolerate the reading but would have been more comfortable with bigger print. She demonstrated poor overall memory - only approx 20% accurately. Patient was dismayed with her inability to remember. Elevated symptoms and fatigue reported especially during the discussion of the content.     06/27/22  Patient completed a close focus visual scanning and discrimination activity using the \"Stepping Stones\" challenge. She needed two cues to " "complete -she did not demonstrate good attention to details - she became verbally frustrated - could not find the envelope - 2/10 dizziness.     06/20/22 - completed saccades measures - refer to \"vision\" section attached for scores     Ocular Motor Control       Visual Tracing       3D Tracking 6/29/22  Seated tracking activity with \"Capture and Control\" using fabric rackets and a small blue spike ball - pt able to engage for approx 5 tosses then symptoms elevated - stopped activity - provided rest - this was the end of the session.     6/27/22  Seated tracking activity with \"Capture and Control\" using fabric rackets and a small blue spike ball - pt able to engage for approx 5 tosses then symptoms elevated - stopped activity - provided rest - this was the end of the session.     Visual Perception       Convergence/Divergence       Accommodation       Visual Stimulation       THER ACT  CPT 82456 TOTAL TIME FOR SESSION 0-7 Minutes   Pain, Vitals, Meds, Etc. Reviewed Reviewed   HEP 06/23/22  Reviewed and additionally explained and practiced the following home exercises to be completed every other day once AM and once PM using the \"1 to 10 functional concusion symptom scale\" as a guide to intensity  1) Level 1 horizontal saccades (needed quadrant cover and blue overlay to better tolerate all exercises provided today)  2) Level 1 vertical saccades  3) Leval 1 Tracing  4) Pencil Push Ups        Visual Endurance        Work/School Simulation        SELF CARE  CPT 33221 TOTAL TIME FOR SESSION 8-22 Minutes   PCS Symptom Management/Education 7/15/22  Reinforced that patient needs to slow down her daily activities and pace, have an escape plan, and inform family of temporary limitations to manage expectations - she agreed  Reviewed the use of \"1 to 10 functional concussion symptom scale\" and energy and symptom management techniques and strategies for exercises and daily routine especially as a result of her episode of severe " symptoms on 6/21/22. Specifically discussed use of strategies to delegate some duties temporarily during recovery. Patient demonstrated good verbal understanding of the information and states will try to apply this approach in daily life. Provided information and practiced the use of blue overlays.           ADL/IADLs

## 2022-07-19 ENCOUNTER — HOSPITAL ENCOUNTER (OUTPATIENT)
Dept: PHYSICAL THERAPY | Age: 42
Setting detail: THERAPIES SERIES
Discharge: HOME | End: 2022-07-19
Attending: FAMILY MEDICINE
Payer: COMMERCIAL

## 2022-07-19 DIAGNOSIS — S06.0X0A CONCUSSION WITHOUT LOSS OF CONSCIOUSNESS, INITIAL ENCOUNTER: Primary | ICD-10-CM

## 2022-07-19 PROCEDURE — 97112 NEUROMUSCULAR REEDUCATION: CPT | Mod: GP

## 2022-07-19 PROCEDURE — 97110 THERAPEUTIC EXERCISES: CPT | Mod: GP

## 2022-07-19 PROCEDURE — 97530 THERAPEUTIC ACTIVITIES: CPT | Mod: GP
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
## 2022-07-19 NOTE — OP PT TREATMENT LOG
"VESTIBULAR PT FLOWSHEET    P.T. TREATMENT LOG   Precautions:    Eval Date: 6-15-22 Progress Note:    POC expires: 9-13-22 Insurance Limits:   Treatment Current Session Time   CANALITH  REPOSITIONING TREATMENT  (CPT 35705) Y/N Notes Not performed   CRT      NEURO RE-ED  (CPT 10236) Y/N Notes 23-37 Minutes   BPPV ASSESSMENT N No recent c/o vertigo   VOMS  See objective section   VOR CANCELLATION                      Standing H/VVOR-C                            6-21-22: FA with 1 inch B hand held:  - HVORC toward windows of gym X 45\"- 3/10 dizziness  FA with 1 inch B on reacher facing grey wall:  - VVORC X 1 minute- no dizziness    6-30-22 FA ; B on extender with hallway as background, 1\" B held at arms length, x30\"  (10x)  -HVOR-C:  no  disorientation, -    vvorcx   Same;   Cues to move head and not smooth pursuit    7-14-22 FA on airex, 1\"B held at arm's length, 30\" ea  HVOR-C: mild unsteadiness at 25\", immediate resolution upon completion  VVOR-C: mild unsteadiness upon stopping, no sxs during, immediate resolution     Ambulation w/ H/VVOR-C Y Main gym; 1\" B on extender; no increase in symptoms; mild disequibrium  HVOR-C vs VVOR-C, 6x30'   VOR / GAZE STABILITY     Standing H/VVOR           Y     6-21-22: Standing with FA and 6 feet from 1 inch B on wall:  -HVOR X 1 at SSV X 30\"- no increase/ fatigued  -VVOR X 1 at SSV X 30\"- no increase/ fatigue    7-19-22: 4' from 1\" B on plain background  - HVORx1: 80 bpm x30\" - white B's at 25 sec,   - HVORx1: 90 bpm x 30\" - patient reporting \"floating white B\" at 20s, no dizziness (x)  - VVORx1: 90 bpm x 30\" - no sxs   - VVORx1: 90 bpm x 60\" - min \"floating white B's toward end\"  + symptoms non-irritable, no dizziness or HA increase during today's session           Ambulation w/H/VVOR Y 1\" B on reacher, SSV, 2x30' ea  HVORx1: mild disequilibrium, no floating B's  VVORx1: slight disequilibrium, no floating B's   H/VVOR on compliant surfaces     Functional VOR     HABITUATION   " "  Ball circles     Repetitive functional movements                          180 turns x 4   Dizziness  2.5/10        Supine > Sit  -Left  Trial 1: 1.5/5, immediate resolution  Trial 2: 1.0/5, \"        \"  Trial 3: 0/5  Trial 4: 0/5  Trial 5: 0/5    Fwd bend > Upright  Trial 1: 1/5, immediate resolution  Trial 2-5: 0/5    Quarter Turns (3 full turns per set)  L: 0.5 2/12 reps, remainder no symptoms  R: 2/5 dizziness with 6/12 reps, increased HA, seated rest break  R: 2nd with 5 sec rest intervals: no sxs     BALANCE- STATIC     On floor  Sr/eo  30 sec    Mod sr  /ec   30 sec   Airex foam    HTs x10, min-mod unsteady, no sxs  HNs x10, min unsteady, no sxs  EC x30\", min unsteady, no sxs  EC + HTs x30\", mod unsteady, hip strategy intermittently    Rockerboard NV    BALANCE- DYNAMIC     Ambulation-head turns/nods     Ambulation - 180 & 360 degree turns     Retro ambulation EO     Ambulation with EC     Obstacles     Tandem     OKS     MSQ      SOT  FGA  DVA        6-21-22:17%   (7/4) 1.2%    6-21-22: 85%    See objective section   THER-EX   (CPT 33956) Y/N SETS REPS LOAD 8-22 Minutes   CARDIOVASCULAR     Nustep  L1 x10 min, 70-80 spm, small towel to cover screen, mild fatigue at end of 10 min   Elliptical  y x8 min  HR 4 min: 141  HR 8 min: 151  RPE:    TM    Max hr   180  50%   90  60%   108  70%  126  80%   144  90%   162  3.1 mph x 13 min, HR up to 114 bpm, HA intensity decrease from 7 to 3/10   THER ACT  (CPT 33261) Y/N  8-22 Minutes   Review pain, meds, falls, vitals, symptoms Y    *Subjective Measures   ABC      DHI        (6/15) 86%  (7/14) 89%    (6/15) 44/100  (7/14) 38/100   Patient Education/HEP                    (6/15) Patient educated on function of vestibular system, sensory integration, PCS, results of objective testing, POC going forward. Verbalizes understanding.   6-21-22: Issued HEP as follows with patient demonstrating proper technique in clinic and verbalizing understanding: VORC in standing, VOR X 1 " "in standing.    (6/28) Downloaded metronome lion and discussed with patient appropriate use, dosing, and progression. Demonstrates good understanding.     6-30  HEP issued/updated/reviewed.  Patient demonstrated good understanding of safety precautions and performance of exercises.  Static standing and vor cx while walking added to current program.  With VOR, pt to try to work with lighting and color of target to reduce glare.  Pt to work up to 85 bpm for 60 sec intervals with FT. Pt scheduled to see ophthalmologist    (7/12) Benefits of mild intensity CV activity in mitigating symptoms and progressing rehab    (7/14) Subjective/objective outcome measures, progress to date, POC going forward       Motion Sensitivity Quotient   DATE:   7-14-22                 Baseline Level (0-5):     0/5               MSQ Score:8%    POSITION INTENSITY ADJUSTED  INTENSITY DURATION SCORE   Sitting to Supine No increase- \"feels better\"      Supine to Left Side Lying No increase      Supine to Right Side Lying No increase      Supine to Sit 1.5 1.5 0 1.5   Left Hallpike NT      to  Sitting NT      Right Hallpike NT      to Sitting NT      Sitting Nose to Left Knee No increase dizz      to Sitting 1.5 1.5 0 1.5   Sitting Nose to Right Knee No increase      to Sitting 0.5 0.5 0 0.5   Sitting Head Rotation (4X) 3.5 3.5 1 4.5   Sitting Head Flex & Ext (4X) 1.5 1.5 0 1.5   Standing  360° Turn to Right 3.0 3.0 0 3.0   Standing 360° Turn to Left 2.0 2.0 0 2.0     Intensity: Scale 0-5 (0= no symptoms and 5= severe symptoms)  Adjusted Intensity: Intensity Level - Baseline Level  Duration: Scale from 0 to 3 for return to baseline (5-10”=1, 11-30”=2, >  30”=3)  Score: Adjusted Intensity + Duration  MOTION SENSITIVITY QUOTIENT % = # Provoking Positions X Total Score   X 100                                                                                                          2048   *If < 16 positions are tested, 2048 is replaced with 8 X (# of " positions tested)2
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
## 2022-07-25 ENCOUNTER — HOSPITAL ENCOUNTER (OUTPATIENT)
Dept: OCCUPATIONAL THERAPY | Age: 42
Setting detail: THERAPIES SERIES
Discharge: HOME | End: 2022-07-25
Attending: FAMILY MEDICINE
Payer: COMMERCIAL

## 2022-07-25 DIAGNOSIS — S06.0X0A CONCUSSION WITHOUT LOSS OF CONSCIOUSNESS, INITIAL ENCOUNTER: Primary | ICD-10-CM

## 2022-07-25 PROCEDURE — 97535 SELF CARE MNGMENT TRAINING: CPT | Mod: GO

## 2022-07-25 PROCEDURE — 97112 NEUROMUSCULAR REEDUCATION: CPT | Mod: GO

## 2022-07-26 ENCOUNTER — HOSPITAL ENCOUNTER (OUTPATIENT)
Dept: PHYSICAL THERAPY | Age: 42
Setting detail: THERAPIES SERIES
Discharge: HOME | End: 2022-07-26
Attending: FAMILY MEDICINE
Payer: COMMERCIAL

## 2022-07-26 DIAGNOSIS — S06.0X0A CONCUSSION WITHOUT LOSS OF CONSCIOUSNESS, INITIAL ENCOUNTER: Primary | ICD-10-CM

## 2022-07-26 PROCEDURE — 97112 NEUROMUSCULAR REEDUCATION: CPT | Mod: GP

## 2022-07-26 PROCEDURE — 97110 THERAPEUTIC EXERCISES: CPT | Mod: GP

## 2022-07-26 NOTE — OP PT TREATMENT LOG
"VESTIBULAR PT FLOWSHEET    P.T. TREATMENT LOG   Precautions:    Eval Date: 6-15-22 Progress Note:    POC expires: 9-13-22 Insurance Limits:   Treatment Current Session Time   CANALITH  REPOSITIONING TREATMENT  (CPT 77975) Y/N Notes Not performed   CRT      NEURO RE-ED  (CPT 75260) Y/N Notes 38-52 Minutes   BPPV ASSESSMENT N No recent c/o vertigo   VOMS  See objective section   VOR CANCELLATION                      Standing H/VVOR-C                            6-21-22: FA with 1 inch B hand held:  - HVORC toward windows of gym X 45\"- 3/10 dizziness  FA with 1 inch B on reacher facing grey wall:  - VVORC X 1 minute- no dizziness    6-30-22 FA ; B on extender with hallway as background, 1\" B held at arms length, x30\"  (10x)  -HVOR-C:  no  disorientation, -    vvorcx   Same;   Cues to move head and not smooth pursuit    7-14-22 FA on airex, 1\"B held at arm's length, 30\" ea  HVOR-C: mild unsteadiness at 25\", immediate resolution upon completion  VVOR-C: mild unsteadiness upon stopping, no sxs during, immediate resolution     Ambulation w/ H/VVOR-C Y Main gym; 2x30' ea 1\" B on extender; no increase in symptoms; mild disequilibrium w/ HVOR-C vs VVOR-C, 6x30'  + pool noodles  +fwd busy board - \"annoying\" but no symptom provocation   VOR / GAZE STABILITY     Standing H/VVOR                6-21-22: Standing with FA and 6 feet from 1 inch B on wall:  -HVOR X 1 at SSV X 30\"- no increase/ fatigued  -VVOR X 1 at SSV X 30\"- no increase/ fatigue    7-19-22: 4' from 1\" B on plain background  - HVORx1: 80 bpm x30\" - white B's at 25 sec,   - HVORx1: 90 bpm x 30\" - patient reporting \"floating white B\" at 20s, no dizziness (x)  - VVORx1: 90 bpm x 30\" - no sxs   - VVORx1: 90 bpm x 60\" - min \"floating white B's toward end\"  + symptoms non-irritable, no dizziness or HA increase during today's session      - VVORx1: 90 bpm x 60\", some white B's ~15\" but able to resolve for remainder  - VVORx1 with butterfly BB x60\", min white B's, able to " "resolve with blinking  - HVORx1 with butterfly BB x60\", min white B's, able to resolve with blinking, slight HA developing over last 15 sec          Ambulation w/H/VVOR Y 1\" B on reacher, SSV, 3x30' ea  HVORx1: slight disequilibrium, no sxs  VVORx1: no sxs    H/VVOR on compliant surfaces     Functional VOR     HABITUATION     Ball circles Y Level 1: x10 CW/CCW, 1/10 disorientation during first set w/ quick resolution, no other sxs   Repetitive functional movements                          180 turns x 4   Dizziness  2.5/10        Supine > Sit  -Left  Trial 1: 1.5/5, immediate resolution  Trial 2: 1.0/5, \"        \"  Trial 3: 0/5  Trial 4: 0/5  Trial 5: 0/5    Fwd bend > Upright  Trial 1: 1/5, immediate resolution  Trial 2-5: 0/5    Quarter Turns (3 full turns per set)  L: 0.5 2/12 reps, remainder no symptoms  R: 2/5 dizziness with 6/12 reps, increased HA, seated rest break  R: 2nd with 5 sec rest intervals: no sxs     BALANCE- STATIC     On floor  Sr/eo  30 sec    Mod sr  /ec   30 sec   Airex foam    HTs x10, min-mod unsteady, no sxs  HNs x10, min unsteady, no sxs  EC x30\", min unsteady, no sxs  EC + HTs x30\", mod unsteady, hip strategy intermittently    Rockerboard Y  Y  Y +HTs, x30\", good balance  +EC x30\", slight unsteady  HTs+EC, slight unsteady   BALANCE- DYNAMIC     Ambulation-head turns/nods     Ambulation - 180 & 360 degree turns Y 180 deg turns every 3 steps, 4x30'   Retro ambulation EO     Ambulation with EC     Obstacles     Tandem     OKS     MSQ      SOT  FGA  DVA        6-21-22:17%   (7/4) 1.2%    6-21-22: 85%    See objective section   THER-EX   (CPT 35077) Y/N SETS REPS LOAD 8-22 Minutes   CARDIOVASCULAR     Nustep  L1 x10 min, 70-80 spm, small towel to cover screen, mild fatigue at end of 10 min   Elliptical  y x10 min  HR peak 157 bpm  RPE: 14/20   TM    Max hr   180  50%   90  60%   108  70%  126  80%   144  90%   162  3.1 mph x 13 min, HR up to 114 bpm, HA intensity decrease from 7 to 3/10   THER " "ACT  (CPT 18335) Y/N  0-7 Minutes   Review pain, meds, falls, vitals, symptoms Y    *Subjective Measures   ABC      DHI        (6/15) 86%  (7/14) 89%    (6/15) 44/100  (7/14) 38/100   Patient Education/HEP                    (6/15) Patient educated on function of vestibular system, sensory integration, PCS, results of objective testing, POC going forward. Verbalizes understanding.   6-21-22: Issued HEP as follows with patient demonstrating proper technique in clinic and verbalizing understanding: VORC in standing, VOR X 1 in standing.    (6/28) Downloaded metronome lion and discussed with patient appropriate use, dosing, and progression. Demonstrates good understanding.     6-30  HEP issued/updated/reviewed.  Patient demonstrated good understanding of safety precautions and performance of exercises.  Static standing and vor cx while walking added to current program.  With VOR, pt to try to work with lighting and color of target to reduce glare.  Pt to work up to 85 bpm for 60 sec intervals with FT. Pt scheduled to see ophthalmologist    (7/12) Benefits of mild intensity CV activity in mitigating symptoms and progressing rehab    (7/14) Subjective/objective outcome measures, progress to date, POC going forward       Motion Sensitivity Quotient   DATE:   7-14-22                 Baseline Level (0-5):     0/5               MSQ Score:8%    POSITION INTENSITY ADJUSTED  INTENSITY DURATION SCORE   Sitting to Supine No increase- \"feels better\"      Supine to Left Side Lying No increase      Supine to Right Side Lying No increase      Supine to Sit 1.5 1.5 0 1.5   Left Hallpike NT      to  Sitting NT      Right Hallpike NT      to Sitting NT      Sitting Nose to Left Knee No increase dizz      to Sitting 1.5 1.5 0 1.5   Sitting Nose to Right Knee No increase      to Sitting 0.5 0.5 0 0.5   Sitting Head Rotation (4X) 3.5 3.5 1 4.5   Sitting Head Flex & Ext (4X) 1.5 1.5 0 1.5   Standing  360° Turn to Right 3.0 3.0 0 3.0 "   Standing 360° Turn to Left 2.0 2.0 0 2.0     Intensity: Scale 0-5 (0= no symptoms and 5= severe symptoms)  Adjusted Intensity: Intensity Level - Baseline Level  Duration: Scale from 0 to 3 for return to baseline (5-10”=1, 11-30”=2, >  30”=3)  Score: Adjusted Intensity + Duration  MOTION SENSITIVITY QUOTIENT % = # Provoking Positions X Total Score   X 100                                                                                                          2048   *If < 16 positions are tested, 2048 is replaced with 8 X (# of positions tested)2

## 2022-07-26 NOTE — OP OT TREATMENT LOG
Today's Treatment:     VISION/CONCUSSION OT FLOW SHEET     OT Vision/mTBI  EXERCISES CURRENT SESSION TIME   NEURO RE-ED  CPT 36397 TOTAL TIME FOR SESSION 38-52 Minutes   Progress Note    Initial Evaluation 07/15/22 - Completed the  #1 progress note - refer to details and metrics attached    06/14/22 - Completed the initial evaluation - metrics to be completed as symptoms allow - unable to complete today secondary to strong headache and fatigue.     Dynavision 7/25/22  D2 divided attention, reaction timing, and task endurance trials as follows:          Program                         Score               Avg Reaction             B 5 sec                           94                       0.64 sec   3 sec 30% Green full board   65R/30G          0.64R & 0.60 G   3 sec 30% Green full board   69R/29G          0.60R & 0.60 G  Good overall tolerance with no increase in symptoms during the activity     7/18/20  D2 divided attention, reaction timing, and task endurance trials as follows:          Program                         Score               Avg Reaction             B 5 sec                           92                       0.65 sec       B 1 sec Fast                         99                       0.60   3 sec 30% Green full board   65R/28G          0.65R & 0.61 G   3 sec 30% Green full board   70R/29G          0.59R & 0.60 G  Fair overall tolerance with a mild increase in symptoms during the activity - 2/10 dizziness and headache  - 1/10 with short rest     6/27/20  D2 divided attention, reaction timing, and task endurance trials as follows:          Program                         Score               Avg Reaction             B 5 sec                           87                       0.68 sec   3 sec 30% Green full board   65R/29G          0.65R & 0.61 G  Fair overall tolerance with a mild increase in symptoms during the activity - 2/10 dizziness and headache  - 1/10 with short rest       6/20/22  D2 divided  "attention, reaction timing, and task endurance trials as follows:          Program                         Score               Avg Reaction             B 5 sec                          79                       0.75 sec      Poor overall tolerance with an increase in symptoms during the activity - 4/10 Headache and fatigue -  Rest required            VTS3/VTS4       CPT       BITs       NVR       Saccadic Fixation 07/18/22  Patient engaged in seated close focus table top scanning and visual clutter activity using the \"[employer]\" word search. She reported that it was more provoking than the Dynavision trials and it was hard to maintain eye control and attention. She required frequent breaks - approx every 30 seconds and reported an elevated H/A to 3/10.    06/29/22  Pt read (in dim light with glasses) a one page paper based article \"Liquid Light\" followed by a discussion of the basic content. Patient was able to tolerate the reading but would have been more comfortable with bigger print. She demonstrated poor overall memory - only approx 20% accurately. Patient was dismayed with her inability to remember. Elevated symptoms and fatigue reported especially during the discussion of the content.     06/27/22  Patient completed a close focus visual scanning and discrimination activity using the \"[employer]\" challenge. She needed two cues to complete -she did not demonstrate good attention to details - she became verbally frustrated - could not find the envelope - 2/10 dizziness.     06/20/22 - completed saccades measures - refer to \"vision\" section attached for scores     Ocular Motor Control       Visual Tracing       3D Tracking 07/25/22  Following the MVPT-4 test patient engaged in standing 3D tracking activity using the fabric rackets and a beach ball - pt able to accurately respond to moderate level challenges with no complaint of additional symptoms - body still sore from the weekend activities. " "    6/29/22  Seated tracking activity with \"Capture and Control\" using fabric rackets and a small blue spike ball - pt able to engage for approx 5 tosses then symptoms elevated - stopped activity - provided rest - this was the end of the session.     6/27/22  Seated tracking activity with \"Capture and Control\" using fabric rackets and a small blue spike ball - pt able to engage for approx 5 tosses then symptoms elevated - stopped activity - provided rest - this was the end of the session.     Visual Perception  07/25/22  Patient completed the entire MVPT-4 test in 45 minutes - score was well within normal limits at 41/45  Patient in the 95th percentile - only mild increase in symptoms     Convergence/Divergence       Accommodation       Visual Stimulation       THER ACT  CPT 16534 TOTAL TIME FOR SESSION 0-7 Minutes   Pain, Vitals, Meds, Etc. Reviewed Reviewed   HEP 06/23/22  Reviewed and additionally explained and practiced the following home exercises to be completed every other day once AM and once PM using the \"1 to 10 functional concusion symptom scale\" as a guide to intensity  1) Level 1 horizontal saccades (needed quadrant cover and blue overlay to better tolerate all exercises provided today)  2) Level 1 vertical saccades  3) Leval 1 Tracing  4) Pencil Push Ups        Visual Endurance        Work/School Simulation        SELF CARE  CPT 90703 TOTAL TIME FOR SESSION 8-22 Minutes   PCS Symptom Management/Education 7/25/22  Reinforced that patient needs to slow down her daily activities and pace, have an escape plan, and inform family of temporary limitations to manage expectations - especially as it pertains to her involvement in the new house for future rental  - she agreed.  Reviewed the use of \"1 to 10 functional concussion symptom scale\" and energy and symptom management techniques and strategies for exercises and daily routine especially as a result of her episode of severe symptoms on 6/21/22. Specifically " discussed use of strategies to delegate some duties temporarily during recovery. Patient demonstrated good verbal understanding of the information and states will try to apply this approach in daily life. Provided information and practiced the use of blue overlays.           ADL/IADLs
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
## 2022-07-27 ENCOUNTER — HOSPITAL ENCOUNTER (OUTPATIENT)
Dept: OCCUPATIONAL THERAPY | Age: 42
Setting detail: THERAPIES SERIES
Discharge: HOME | End: 2022-07-27
Attending: FAMILY MEDICINE
Payer: COMMERCIAL

## 2022-07-27 DIAGNOSIS — S06.0X0A CONCUSSION WITHOUT LOSS OF CONSCIOUSNESS, INITIAL ENCOUNTER: Primary | ICD-10-CM

## 2022-07-27 PROCEDURE — 97112 NEUROMUSCULAR REEDUCATION: CPT | Mod: GO

## 2022-07-27 PROCEDURE — 97535 SELF CARE MNGMENT TRAINING: CPT | Mod: GO

## 2022-07-28 NOTE — OP OT TREATMENT LOG
Today's Treatment:     VISION/CONCUSSION OT FLOW SHEET     OT Vision/mTBI  EXERCISES CURRENT SESSION TIME   NEURO RE-ED  CPT 98225 TOTAL TIME FOR SESSION 38-52 Minutes   Progress Note    Initial Evaluation 07/15/22 - Completed the  #1 progress note - refer to details and metrics attached    06/14/22 - Completed the initial evaluation - metrics to be completed as symptoms allow - unable to complete today secondary to strong headache and fatigue.     Dynavision 7/25/22  D2 divided attention, reaction timing, and task endurance trials as follows:          Program                         Score               Avg Reaction             B 5 sec                           94                       0.64 sec   3 sec 30% Green full board   65R/30G          0.64R & 0.60 G   3 sec 30% Green full board   69R/29G          0.60R & 0.60 G  Good overall tolerance with no increase in symptoms during the activity     7/18/20  D2 divided attention, reaction timing, and task endurance trials as follows:          Program                         Score               Avg Reaction             B 5 sec                           92                       0.65 sec       B 1 sec Fast                         99                       0.60   3 sec 30% Green full board   65R/28G          0.65R & 0.61 G   3 sec 30% Green full board   70R/29G          0.59R & 0.60 G  Fair overall tolerance with a mild increase in symptoms during the activity - 2/10 dizziness and headache  - 1/10 with short rest     6/27/20  D2 divided attention, reaction timing, and task endurance trials as follows:          Program                         Score               Avg Reaction             B 5 sec                           87                       0.68 sec   3 sec 30% Green full board   65R/29G          0.65R & 0.61 G  Fair overall tolerance with a mild increase in symptoms during the activity - 2/10 dizziness and headache  - 1/10 with short rest       6/20/22  D2 divided  "attention, reaction timing, and task endurance trials as follows:          Program                         Score               Avg Reaction             B 5 sec                          79                       0.75 sec      Poor overall tolerance with an increase in symptoms during the activity - 4/10 Headache and fatigue -  Rest required            VTS3/VTS4       CPT       BITs       NVR       Saccadic Fixation 07/27/22  Patient engaged in close focus table top scanning, visual perceptual, contrast sensitivity, and visual clutter challenge using the \"SET\" game - patient able to follow complex directions well with good ability to understand the directions and find sets. Patient reported 2/10 headache provoked.    07/18/22  Patient engaged in seated close focus table top scanning and visual clutter activity using the \"[employer]\" word search. She reported that it was more provoking than the Dynavision trials and it was hard to maintain eye control and attention. She required frequent breaks - approx every 30 seconds and reported an elevated H/A to 3/10.    06/29/22  Pt read (in dim light with glasses) a one page paper based article \"Liquid Light\" followed by a discussion of the basic content. Patient was able to tolerate the reading but would have been more comfortable with bigger print. She demonstrated poor overall memory - only approx 20% accurately. Patient was dismayed with her inability to remember. Elevated symptoms and fatigue reported especially during the discussion of the content.     06/27/22  Patient completed a close focus visual scanning and discrimination activity using the \"[employer]\" challenge. She needed two cues to complete -she did not demonstrate good attention to details - she became verbally frustrated - could not find the envelope - 2/10 dizziness.     06/20/22 - completed saccades measures - refer to \"vision\" section attached for scores     Ocular Motor Control       Visual Tracing " "      3D Tracking 07/25/22  Following the MVPT-4 test patient engaged in standing 3D tracking activity using the fabric rackets and a beach ball - pt able to accurately respond to moderate level challenges with no complaint of additional symptoms - body still sore from the weekend activities.     6/29/22  Seated tracking activity with \"Capture and Control\" using fabric rackets and a small blue spike ball - pt able to engage for approx 5 tosses then symptoms elevated - stopped activity - provided rest - this was the end of the session.     6/27/22  Seated tracking activity with \"Capture and Control\" using fabric rackets and a small blue spike ball - pt able to engage for approx 5 tosses then symptoms elevated - stopped activity - provided rest - this was the end of the session.     Visual Perception  07/25/22  Patient completed the entire MVPT-4 test in 45 minutes - score was well within normal limits at 41/45  Patient in the 95th percentile - only mild increase in symptoms     Convergence/Divergence       Accommodation       Visual Stimulation       THER ACT  CPT 87776 TOTAL TIME FOR SESSION 0-7 Minutes   Pain, Vitals, Meds, Etc. Reviewed Reviewed   HEP 06/23/22  Reviewed and additionally explained and practiced the following home exercises to be completed every other day once AM and once PM using the \"1 to 10 functional concusion symptom scale\" as a guide to intensity  1) Level 1 horizontal saccades (needed quadrant cover and blue overlay to better tolerate all exercises provided today)  2) Level 1 vertical saccades  3) Leval 1 Tracing  4) Pencil Push Ups        Visual Endurance        Work/School Simulation        SELF CARE  CPT 72150 TOTAL TIME FOR SESSION 8-22 Minutes   PCS Symptom Management/Education 7/27/22  Provided additional eduction regarding energy management and dividing tasks into two categories - one exercise and two function - related to provocation of symptoms to a therapeutic level and management " "of symptoms for tasks - good verbal understanding demonstrated.    7/25/22  Reinforced that patient needs to slow down her daily activities and pace, have an escape plan, and inform family of temporary limitations to manage expectations - especially as it pertains to her involvement in the new house for future rental  - she agreed.  Reviewed the use of \"1 to 10 functional concussion symptom scale\" and energy and symptom management techniques and strategies for exercises and daily routine especially as a result of her episode of severe symptoms on 6/21/22. Specifically discussed use of strategies to delegate some duties temporarily during recovery. Patient demonstrated good verbal understanding of the information and states will try to apply this approach in daily life. Provided information and practiced the use of blue overlays.           ADL/IADLs                    "

## 2022-07-29 ENCOUNTER — HOSPITAL ENCOUNTER (OUTPATIENT)
Dept: PHYSICAL THERAPY | Age: 42
Setting detail: THERAPIES SERIES
Discharge: HOME | End: 2022-07-29
Attending: FAMILY MEDICINE
Payer: COMMERCIAL

## 2022-07-29 DIAGNOSIS — S06.0X0A CONCUSSION WITHOUT LOSS OF CONSCIOUSNESS, INITIAL ENCOUNTER: Primary | ICD-10-CM

## 2022-07-29 PROCEDURE — 97530 THERAPEUTIC ACTIVITIES: CPT | Mod: GP

## 2022-07-29 PROCEDURE — 97110 THERAPEUTIC EXERCISES: CPT | Mod: GP

## 2022-07-29 PROCEDURE — 97112 NEUROMUSCULAR REEDUCATION: CPT | Mod: GP

## 2022-07-29 NOTE — OP PT TREATMENT LOG
"VESTIBULAR PT FLOWSHEET    P.T. TREATMENT LOG   Precautions:    Eval Date: 6-15-22 Progress Note:    POC expires: 9-13-22 Insurance Limits:   Treatment Current Session Time   CANALITH  REPOSITIONING TREATMENT  (CPT 77714) Y/N Notes Not performed   CRT      NEURO RE-ED  (CPT 38494) Y/N Notes 23-37 Minutes   BPPV ASSESSMENT N No recent c/o vertigo   VOMS  See objective section   VOR CANCELLATION                      Standing H/VVOR-C                            6-21-22: FA with 1 inch B hand held:  - HVORC toward windows of gym X 45\"- 3/10 dizziness  FA with 1 inch B on reacher facing grey wall:  - VVORC X 1 minute- no dizziness    6-30-22 FA ; B on extender with hallway as background, 1\" B held at arms length, x30\"  (10x)  -HVOR-C:  no  disorientation, -    vvorcx   Same;   Cues to move head and not smooth pursuit    7-14-22 FA on airex, 1\"B held at arm's length, 30\" ea  HVOR-C: mild unsteadiness at 25\", immediate resolution upon completion  VVOR-C: mild unsteadiness upon stopping, no sxs during, immediate resolution     Ambulation w/ H/VVOR-C  Main gym; 2x30' ea 1\" B on extender; no increase in symptoms; mild disequilibrium w/ HVOR-C vs VVOR-C, 6x30'  + pool noodles  +fwd busy board - \"annoying\" but no symptom provocation   VOR / GAZE STABILITY     Standing H/VVOR                                                 y 6-21-22: Standing with FA and 6 feet from 1 inch B on wall:  -HVOR X 1 at SSV X 30\"- no increase/ fatigued  -VVOR X 1 at SSV X 30\"- no increase/ fatigue    7-19-22: 4' from 1\" B on plain background  - HVORx1: 80 bpm x30\" - white B's at 25 sec,   - HVORx1: 90 bpm x 30\" - patient reporting \"floating white B\" at 20s, no dizziness (x)  - VVORx1: 90 bpm x 30\" - no sxs   - VVORx1: 90 bpm x 60\" - min \"floating white B's toward end\"  + symptoms non-irritable, no dizziness or HA increase during today's session      - VVORx1: 90 bpm x 60\", some white B's ~15\" but able to resolve for remainder  - VVORx1 with butterfly " "BB x60\", min white B's, able to resolve with blinking  - HVORx1 with butterfly BB x60\", min white B's, able to resolve with blinking, slight HA developing over last 15 sec     Standing FT with small B on OKS stripes scrolling vertically with met at 150 bpm:  -HVOR X 1 minute- no dizziness, but challenging  -VVOR X 1 minute- no dizziness, but challenging         Ambulation w/H/VVOR  1\" B on reacher, SSV, 3x30' ea  HVORx1: slight disequilibrium, no sxs  VVORx1: no sxs    H/VVOR on compliant surfaces y 7-29-22: Standing on Airex with FA with 1 inch B on black and white flowered busy board:  -HVOR X 1 at 120 bpm- no increase  -HVOR X 1 at 150 bpm- 1-2/10   -VVOR X 1 at 150 bpm- no increase   Functional VOR y 7-29-22:  Bounce on minitramp with tennis ball catch and toss to baskets left and right:  -with HVOR X 1 1/2 minutes- mild dizziness, occ.imbalance- able to self correct  - with VVOR X 1 1/2 minutes- improved balance  Bounce on minitramp with turn to catch tennis ball over shoulder X 20 balls to each side.   HABITUATION     Ball circles  Level 1: x10 CW/CCW, 1/10 disorientation during first set w/ quick resolution, no other sxs   Repetitive functional movements                          180 turns x 4   Dizziness  2.5/10        Supine > Sit  -Left  Trial 1: 1.5/5, immediate resolution  Trial 2: 1.0/5, \"        \"  Trial 3: 0/5  Trial 4: 0/5  Trial 5: 0/5    Fwd bend > Upright  Trial 1: 1/5, immediate resolution  Trial 2-5: 0/5    Quarter Turns (3 full turns per set)  L: 0.5 2/12 reps, remainder no symptoms  R: 2/5 dizziness with 6/12 reps, increased HA, seated rest break  R: 2nd with 5 sec rest intervals: no sxs     BALANCE- STATIC     On floor  Sr/eo  30 sec    Mod sr  /ec   30 sec   Airex foam    HTs x10, min-mod unsteady, no sxs  HNs x10, min unsteady, no sxs  EC x30\", min unsteady, no sxs  EC + HTs x30\", mod unsteady, hip strategy intermittently    Rockerboard  +HTs, x30\", good balance  +EC x30\", slight " unsteady  HTs+EC, slight unsteady   BALANCE- DYNAMIC     Ambulation-head turns/nods     Ambulation - 180 & 360 degree turns  180 deg turns every 3 steps, 4x30'   Retro ambulation EO     Ambulation with EC     Obstacles     Tandem     OKS     MSQ      SOT  FGA  DVA        6-21-22:17%   (7/4) 1.2%    6-21-22: 85%    See objective section   THER-EX   (CPT 09052) Y/N SETS REPS LOAD 8-22 Minutes   CARDIOVASCULAR     Nustep  L1 x10 min, 70-80 spm, small towel to cover screen, mild fatigue at end of 10 min   Elliptical   x10 min  HR peak 157 bpm  RPE: 14/20   TM    Max hr   180  50%   90  60%   108  70%  126  80%   144  90%   162       y 3.1 mph x 13 min, HR up to 114 bpm, HA intensity decrease from 7 to 3/10    7-29-22: BCTT- see chart below   THER ACT  (CPT 69669) Y/N  8-22 Minutes   Review pain, meds, falls, vitals, symptoms y    *Subjective Measures   ABC      DHI        (6/15) 86%  (7/14) 89%    (6/15) 44/100  (7/14) 38/100   Patient Education/HEP   y                 (6/15) Patient educated on function of vestibular system, sensory integration, PCS, results of objective testing, POC going forward. Verbalizes understanding.   6-21-22: Issued HEP as follows with patient demonstrating proper technique in clinic and verbalizing understanding: VORC in standing, VOR X 1 in standing.    (6/28) Downloaded metronome lion and discussed with patient appropriate use, dosing, and progression. Demonstrates good understanding.     6-30  HEP issued/updated/reviewed.  Patient demonstrated good understanding of safety precautions and performance of exercises.  Static standing and vor cx while walking added to current program.  With VOR, pt to try to work with lighting and color of target to reduce glare.  Pt to work up to 85 bpm for 60 sec intervals with FT. Pt scheduled to see ophthalmologist    (7/12) Benefits of mild intensity CV activity in mitigating symptoms and progressing rehab    (7/14) Subjective/objective outcome measures,  "progress to date, POC going forward    7-29-22: Issued HEP as follows with patient demonstrating proper technique in clinic and verbalizing understanding: Metronome increased to 150 bpm and standing with FT on cushion. Also suggested OKS for home.       Motion Sensitivity Quotient   DATE:   7-14-22                 Baseline Level (0-5):     0/5               MSQ Score:8%    POSITION INTENSITY ADJUSTED  INTENSITY DURATION SCORE   Sitting to Supine No increase- \"feels better\"      Supine to Left Side Lying No increase      Supine to Right Side Lying No increase      Supine to Sit 1.5 1.5 0 1.5   Left Hallpike NT      to  Sitting NT      Right Hallpike NT      to Sitting NT      Sitting Nose to Left Knee No increase dizz      to Sitting 1.5 1.5 0 1.5   Sitting Nose to Right Knee No increase      to Sitting 0.5 0.5 0 0.5   Sitting Head Rotation (4X) 3.5 3.5 1 4.5   Sitting Head Flex & Ext (4X) 1.5 1.5 0 1.5   Standing  360° Turn to Right 3.0 3.0 0 3.0   Standing 360° Turn to Left 2.0 2.0 0 2.0     Intensity: Scale 0-5 (0= no symptoms and 5= severe symptoms)  Adjusted Intensity: Intensity Level - Baseline Level  Duration: Scale from 0 to 3 for return to baseline (5-10”=1, 11-30”=2, >  30”=3)  Score: Adjusted Intensity + Duration  MOTION SENSITIVITY QUOTIENT % = # Provoking Positions X Total Score   X 100                                                                                                          2048   *If < 16 positions are tested, 2048 is replaced with 8 X (# of positions tested)2    [place] Concussion Treadmill Test  Date:   7-29-22                     Baseline overall symptom level = 0/10  60% MHR = 108          70% MHR = 126  80% MHR = 144  90% MHR = 162  Max HR= 180    Test performed at speed 3.3    Minute Incline HR RPE Symptoms Comments   0 0       1 1%       2 2%       3 3%       4 4%       5 5%       6 6%       7 7%       8 8% 153 13 none    9 9%       10 10%       11 11%       12 12% 155 14 none  "   13 13%       14 14%       15 15% 172 15 none    16 15%; speed ____    PASSED   17 15%; speed ____       18 15%; speed ____       19 15%; speed ____       20 15%; speed ____         Posttest assessment:   Test stopped at: 16 minutes due to test completed  Post test vitals = 123/82 [place]=[id number]  Overall symptom level = none
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
## 2022-08-02 ENCOUNTER — HOSPITAL ENCOUNTER (OUTPATIENT)
Dept: PHYSICAL THERAPY | Age: 42
Setting detail: THERAPIES SERIES
Discharge: HOME | End: 2022-08-02
Attending: FAMILY MEDICINE
Payer: COMMERCIAL

## 2022-08-02 DIAGNOSIS — S06.0X0A CONCUSSION WITHOUT LOSS OF CONSCIOUSNESS, INITIAL ENCOUNTER: Primary | ICD-10-CM
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
## 2022-08-02 PROCEDURE — 97530 THERAPEUTIC ACTIVITIES: CPT | Mod: GP

## 2022-08-02 PROCEDURE — 97112 NEUROMUSCULAR REEDUCATION: CPT | Mod: GP

## 2022-08-02 NOTE — OP PT TREATMENT LOG
"VESTIBULAR PT FLOWSHEET    P.T. TREATMENT LOG   Precautions:    Eval Date: 6-15-22 Progress Note:    POC expires: 9-13-22 Insurance Limits:   Treatment Current Session Time   CANALITH  REPOSITIONING TREATMENT  (CPT 65341) Y/N Notes Not performed   CRT      NEURO RE-ED  (CPT 55677) Y/N Notes 23-37 Minutes   BPPV ASSESSMENT N No recent c/o vertigo   VOMS  See objective section   VOR CANCELLATION                      Standing H/VVOR-C                            6-21-22: FA with 1 inch B hand held:  - HVORC toward windows of gym X 45\"- 3/10 dizziness  FA with 1 inch B on reacher facing grey wall:  - VVORC X 1 minute- no dizziness    6-30-22 FA ; B on extender with hallway as background, 1\" B held at arms length, x30\"  (10x)  -HVOR-C:  no  disorientation, -    vvorcx   Same;   Cues to move head and not smooth pursuit    7-14-22 FA on airex, 1\"B held at arm's length, 30\" ea  HVOR-C: mild unsteadiness at 25\", immediate resolution upon completion  VVOR-C: mild unsteadiness upon stopping, no sxs during, immediate resolution     Ambulation w/ H/VVOR-C  Main gym; 2x30' ea 1\" B on extender; no increase in symptoms; mild disequilibrium w/ HVOR-C vs VVOR-C, 6x30'  + pool noodles  +fwd busy board - \"annoying\" but no symptom provocation   VOR / GAZE STABILITY     Standing H/VVOR                                                  6-21-22: Standing with FA and 6 feet from 1 inch B on wall:  -HVOR X 1 at SSV X 30\"- no increase/ fatigued  -VVOR X 1 at SSV X 30\"- no increase/ fatigue    7-19-22: 4' from 1\" B on plain background  - HVORx1: 80 bpm x30\" - white B's at 25 sec,   - HVORx1: 90 bpm x 30\" - patient reporting \"floating white B\" at 20s, no dizziness (x)  - VVORx1: 90 bpm x 30\" - no sxs   - VVORx1: 90 bpm x 60\" - min \"floating white B's toward end\"  + symptoms non-irritable, no dizziness or HA increase during today's session      - VVORx1: 90 bpm x 60\", some white B's ~15\" but able to resolve for remainder  - VVORx1 with butterfly " "BB x60\", min white B's, able to resolve with blinking  - HVORx1 with butterfly BB x60\", min white B's, able to resolve with blinking, slight HA developing over last 15 sec     Standing FT with small B on OKS stripes scrolling vertically with met at 150 bpm:  -HVOR X 1 minute- no dizziness, but challenging  -VVOR X 1 minute- no dizziness, but challenging         Ambulation w/H/VVOR  1\" B on reacher, SSV, 3x30' ea  HVORx1: slight disequilibrium, no sxs  VVORx1: no sxs    H/VVOR on compliant surfaces  7-29-22: Standing on Airex with FA with 1 inch B on black and white flowered busy board:  -HVOR X 1 at 120 bpm- no increase  -HVOR X 1 at 150 bpm- 1-2/10   -VVOR X 1 at 150 bpm- no increase   Functional VOR  7-29-22:  Bounce on minitramp with tennis ball catch and toss to baskets left and right:  -with HVOR X 1 1/2 minutes- mild dizziness, occ.imbalance- able to self correct  - with VVOR X 1 1/2 minutes- improved balance  Bounce on minitramp with turn to catch tennis ball over shoulder X 20 balls to each side.   HABITUATION     Ball circles  Level 1: x10 CW/CCW, 1/10 disorientation during first set w/ quick resolution, no other sxs   Repetitive functional movements                          180 turns x 4   Dizziness  2.5/10        Supine > Sit  -Left  Trial 1: 1.5/5, immediate resolution  Trial 2: 1.0/5, \"        \"  Trial 3: 0/5  Trial 4: 0/5  Trial 5: 0/5    Fwd bend > Upright  Trial 1: 1/5, immediate resolution  Trial 2-5: 0/5    Quarter Turns (3 full turns per set)  L: 0.5 2/12 reps, remainder no symptoms  R: 2/5 dizziness with 6/12 reps, increased HA, seated rest break  R: 2nd with 5 sec rest intervals: no sxs     BALANCE- STATIC     On floor  Sr/eo  30 sec    Mod sr  /ec   30 sec   Airex foam    HTs x10, min-mod unsteady, no sxs  HNs x10, min unsteady, no sxs  EC x30\", min unsteady, no sxs  EC + HTs x30\", mod unsteady, hip strategy intermittently    Rockerboard  +HTs, x30\", good balance  +EC x30\", slight " unsteady  HTs+EC, slight unsteady   BALANCE- DYNAMIC     Ambulation-head turns/nods     Ambulation - 180 & 360 degree turns  180 deg turns every 3 steps, 4x30'   Retro ambulation EO     Ambulation with EC     Obstacles     Tandem     OKS     MSQ      SOT  FGA  DVA  GST         y  y  y 6-21-22:17%   (7/4) 1.2%    6-21-22: 85%  8-2-22: 30/30  8-2-22: L=1.7 R=2.5 U=2 D= 2.5  8-2-22: L= 152   R= 183   U=145 D=188   THER-EX   (CPT 72974) Y/N SETS REPS LOAD Not performed   CARDIOVASCULAR     Nustep  L1 x10 min, 70-80 spm, small towel to cover screen, mild fatigue at end of 10 min   Elliptical   x10 min  HR peak 157 bpm  RPE: 14/20   TM    Max hr   180  50%   90  60%   108  70%  126  80%   144  90%   162        3.1 mph x 13 min, HR up to 114 bpm, HA intensity decrease from 7 to 3/10    7-29-22: BCTT- see chart below   THER ACT  (CPT 61754) Y/N  8-22 Minutes   Review pain, meds, falls, vitals, symptoms y    *Subjective Measures   ABC      DHI           y   (6/15) 86%  (7/14) 89%    (6/15) 44/100  (7/14) 38/100  8-2-22: 6%   Patient Education/HEP   y                 (6/15) Patient educated on function of vestibular system, sensory integration, PCS, results of objective testing, POC going forward. Verbalizes understanding.   6-21-22: Issued HEP as follows with patient demonstrating proper technique in clinic and verbalizing understanding: VORC in standing, VOR X 1 in standing.    (6/28) Downloaded metronome lion and discussed with patient appropriate use, dosing, and progression. Demonstrates good understanding.     6-30  HEP issued/updated/reviewed.  Patient demonstrated good understanding of safety precautions and performance of exercises.  Static standing and vor cx while walking added to current program.  With VOR, pt to try to work with lighting and color of target to reduce glare.  Pt to work up to 85 bpm for 60 sec intervals with FT. Pt scheduled to see ophthalmologist    (7/12) Benefits of mild intensity CV activity in  "mitigating symptoms and progressing rehab    (7/14) Subjective/objective outcome measures, progress to date, POC going forward    7-29-22: Issued HEP as follows with patient demonstrating proper technique in clinic and verbalizing understanding: Metronome increased to 150 bpm and standing with FT on cushion. Also suggested OKS for home.    8-2-22: Instructed to continue VOR exercises with gradual decline in frequency over next couple months. Advised to continue to monitor energy level, while remaining healing occurs.       Motion Sensitivity Quotient   DATE:   7-14-22                 Baseline Level (0-5):     0/5               MSQ Score:8%    POSITION INTENSITY ADJUSTED  INTENSITY DURATION SCORE   Sitting to Supine No increase- \"feels better\"      Supine to Left Side Lying No increase      Supine to Right Side Lying No increase      Supine to Sit 1.5 1.5 0 1.5   Left Hallpike NT      to  Sitting NT      Right Hallpike NT      to Sitting NT      Sitting Nose to Left Knee No increase dizz      to Sitting 1.5 1.5 0 1.5   Sitting Nose to Right Knee No increase      to Sitting 0.5 0.5 0 0.5   Sitting Head Rotation (4X) 3.5 3.5 1 4.5   Sitting Head Flex & Ext (4X) 1.5 1.5 0 1.5   Standing  360° Turn to Right 3.0 3.0 0 3.0   Standing 360° Turn to Left 2.0 2.0 0 2.0     Intensity: Scale 0-5 (0= no symptoms and 5= severe symptoms)  Adjusted Intensity: Intensity Level - Baseline Level  Duration: Scale from 0 to 3 for return to baseline (5-10”=1, 11-30”=2, >  30”=3)  Score: Adjusted Intensity + Duration  MOTION SENSITIVITY QUOTIENT % = # Provoking Positions X Total Score   X 100                                                                                                          2048   *If < 16 positions are tested, 2048 is replaced with 8 X (# of positions tested)2    Jessamine Concussion Treadmill Test  Date:   7-29-22                     Baseline overall symptom level = 0/10  60% MHR = 108          70% MHR = 126  80% MHR = " 144  90% MHR = 162  Max HR= 180    Test performed at speed 3.3    Minute Incline HR RPE Symptoms Comments   0 0       1 1%       2 2%       3 3%       4 4%       5 5%       6 6%       7 7%       8 8% 153 13 none    9 9%       10 10%       11 11%       12 12% 155 14 none    13 13%       14 14%       15 15% 172 15 none    16 15%; speed ____    PASSED   17 15%; speed ____       18 15%; speed ____       19 15%; speed ____       20 15%; speed ____         Posttest assessment:   Test stopped at: 16 minutes due to test completed  Post test vitals = 123/82 [id number]  Overall symptom level = none
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
## 2022-08-03 ENCOUNTER — HOSPITAL ENCOUNTER (OUTPATIENT)
Dept: OCCUPATIONAL THERAPY | Age: 42
Setting detail: THERAPIES SERIES
Discharge: HOME | End: 2022-08-03
Attending: FAMILY MEDICINE
Payer: COMMERCIAL

## 2022-08-03 DIAGNOSIS — S06.0X0A CONCUSSION WITHOUT LOSS OF CONSCIOUSNESS, INITIAL ENCOUNTER: Primary | ICD-10-CM

## 2022-08-03 PROCEDURE — 97112 NEUROMUSCULAR REEDUCATION: CPT | Mod: GO

## 2022-08-03 PROCEDURE — 97535 SELF CARE MNGMENT TRAINING: CPT | Mod: GO

## 2022-08-04 NOTE — OP OT TREATMENT LOG
"Today's Treatment:     VISION/CONCUSSION OT FLOW SHEET     OT Vision/mTBI  EXERCISES CURRENT SESSION TIME   NEURO RE-ED  CPT 19957 TOTAL TIME FOR SESSION 38-52 Minutes   Progress Note    Initial Evaluation 07/15/22 - Completed the  #1 progress note - refer to details and metrics attached    06/14/22 - Completed the initial evaluation - metrics to be completed as symptoms allow - unable to complete today secondary to strong headache and fatigue.     Dynavision   8/03/22  D2 trials for divided attention, reaction timing, and task endurance completed - all previous scores were improved upon - all scores were well above standards.          Good overall tolerance with no symptoms during the activity - she is ready for discharge.       6/20/22  D2 divided attention, reaction timing, and task endurance trials as follows:          Program                         Score               Avg Reaction             B 5 sec                          79                       0.75 sec      Poor overall tolerance with an increase in symptoms during the activity - 4/10 Headache and fatigue -  Rest required            VTS3/VTS4       CPT       BITs       NVR       Saccadic Fixation 07/27/22  Patient engaged in close focus table top scanning, visual perceptual, contrast sensitivity, and visual clutter challenge using the \"SET\" game - patient able to follow complex directions well with good ability to understand the directions and find sets. Patient reported 2/10 headache provoked.    07/18/22  Patient engaged in seated close focus table top scanning and visual clutter activity using the \"[employer]\" word search. She reported that it was more provoking than the Dynavision trials and it was hard to maintain eye control and attention. She required frequent breaks - approx every 30 seconds and reported an elevated H/A to 3/10.    06/29/22  Pt read (in dim light with glasses) a one page paper based article \"Liquid Light\" followed by a " "discussion of the basic content. Patient was able to tolerate the reading but would have been more comfortable with bigger print. She demonstrated poor overall memory - only approx 20% accurately. Patient was dismayed with her inability to remember. Elevated symptoms and fatigue reported especially during the discussion of the content.     06/27/22  Patient completed a close focus visual scanning and discrimination activity using the \"[employer]\" challenge. She needed two cues to complete -she did not demonstrate good attention to details - she became verbally frustrated - could not find the envelope - 2/10 dizziness.     06/20/22 - completed saccades measures - refer to \"vision\" section attached for scores     Ocular Motor Control       Visual Tracing       3D Tracking 08/03/22  Patient engaged in standing high speed volley using the fabric rackets and the small light weight spike ball - she was able to respond well with no reported symptoms.    07/25/22  Following the MVPT-4 test patient engaged in standing 3D tracking activity using the fabric rackets and a beach ball - pt able to accurately respond to moderate level challenges with no complaint of additional symptoms - body still sore from the weekend activities.     6/29/22  Seated tracking activity with \"Capture and Control\" using fabric rackets and a small blue spike ball - pt able to engage for approx 5 tosses then symptoms elevated - stopped activity - provided rest - this was the end of the session.     6/27/22  Seated tracking activity with \"Capture and Control\" using fabric rackets and a small blue spike ball - pt able to engage for approx 5 tosses then symptoms elevated - stopped activity - provided rest - this was the end of the session.     Visual Perception  07/25/22  Patient completed the entire MVPT-4 test in 45 minutes - score was well within normal limits at 41/45  Patient in the 95th percentile - only mild increase in symptoms   " "  Convergence/Divergence       Accommodation       Visual Stimulation       THER ACT  CPT 42847 TOTAL TIME FOR SESSION 0-7 Minutes   Pain, Vitals, Meds, Etc. Reviewed Reviewed   HEP 06/23/22  Reviewed and additionally explained and practiced the following home exercises to be completed every other day once AM and once PM using the \"1 to 10 functional concusion symptom scale\" as a guide to intensity  1) Level 1 horizontal saccades (needed quadrant cover and blue overlay to better tolerate all exercises provided today)  2) Level 1 vertical saccades  3) Leval 1 Tracing  4) Pencil Push Ups        Visual Endurance        Work/School Simulation        SELF CARE  CPT 06581 TOTAL TIME FOR SESSION 8-22 Minutes   PCS Symptom Management/Education 08/03/22  Reviewed energy and symptom management techniques and strategies especially as they pertain to ramping up her activities as she starts feeling more like herself. Patient demonstrated good verbal understanding of the information and states that she will apply this approach in her daily life post discharge.    7/27/22  Provided additional eduction regarding energy management and dividing tasks into two categories - one exercise and two function - related to provocation of symptoms to a therapeutic level and management of symptoms for tasks - good verbal understanding demonstrated.    7/25/22  Reinforced that patient needs to slow down her daily activities and pace, have an escape plan, and inform family of temporary limitations to manage expectations - especially as it pertains to her involvement in the new house for future rental  - she agreed.  Reviewed the use of \"1 to 10 functional concussion symptom scale\" and energy and symptom management techniques and strategies for exercises and daily routine especially as a result of her episode of severe symptoms on 6/21/22. Specifically discussed use of strategies to delegate some duties temporarily during recovery. Patient " demonstrated good verbal understanding of the information and states will try to apply this approach in daily life. Provided information and practiced the use of blue overlays.           ADL/IADLs
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
## 2022-08-05 ENCOUNTER — HOSPITAL ENCOUNTER (OUTPATIENT)
Dept: OCCUPATIONAL THERAPY | Age: 42
Setting detail: THERAPIES SERIES
Discharge: HOME | End: 2022-08-05
Attending: FAMILY MEDICINE
Payer: COMMERCIAL

## 2022-08-05 DIAGNOSIS — S06.0X0A CONCUSSION WITHOUT LOSS OF CONSCIOUSNESS, INITIAL ENCOUNTER: Primary | ICD-10-CM

## 2022-08-05 PROCEDURE — 97535 SELF CARE MNGMENT TRAINING: CPT | Mod: GO

## 2022-08-05 PROCEDURE — 97112 NEUROMUSCULAR REEDUCATION: CPT | Mod: GO

## 2022-08-05 NOTE — OP OT TREATMENT LOG
"Today's Treatment:     VISION/CONCUSSION OT FLOW SHEET     OT Vision/mTBI  EXERCISES CURRENT SESSION TIME   NEURO RE-ED  CPT 21421 TOTAL TIME FOR SESSION 38-52 Minutes   Discharge Eval    Progress Note    Initial Evaluation 08/05/22 - Discharge evaluation - refer to details attached    07/15/22 - Completed the  #1 progress note - refer to details and metrics attached    06/14/22 - Completed the initial evaluation - metrics to be completed as symptoms allow - unable to complete today secondary to strong headache and fatigue.     Dynavision   8/03/22  D2 trials for divided attention, reaction timing, and task endurance completed - all previous scores were improved upon - all scores were well above standards.          Good overall tolerance with no symptoms during the activity - she is ready for discharge.       6/20/22  D2 divided attention, reaction timing, and task endurance trials as follows:          Program                         Score               Avg Reaction             B 5 sec                          79                       0.75 sec      Poor overall tolerance with an increase in symptoms during the activity - 4/10 Headache and fatigue -  Rest required            VTS3/VTS4       CPT       BITs       NVR       Saccadic Fixation 07/27/22  Patient engaged in close focus table top scanning, visual perceptual, contrast sensitivity, and visual clutter challenge using the \"SET\" game - patient able to follow complex directions well with good ability to understand the directions and find sets. Patient reported 2/10 headache provoked.    07/18/22  Patient engaged in seated close focus table top scanning and visual clutter activity using the \"[employer]\" word search. She reported that it was more provoking than the Dynavision trials and it was hard to maintain eye control and attention. She required frequent breaks - approx every 30 seconds and reported an elevated H/A to 3/10.    06/29/22  Pt read (in dim light " "with glasses) a one page paper based article \"Liquid Light\" followed by a discussion of the basic content. Patient was able to tolerate the reading but would have been more comfortable with bigger print. She demonstrated poor overall memory - only approx 20% accurately. Patient was dismayed with her inability to remember. Elevated symptoms and fatigue reported especially during the discussion of the content.     06/27/22  Patient completed a close focus visual scanning and discrimination activity using the \"Stepping Stones\" challenge. She needed two cues to complete -she did not demonstrate good attention to details - she became verbally frustrated - could not find the envelope - 2/10 dizziness.     06/20/22 - completed saccades measures - refer to \"vision\" section attached for scores     Ocular Motor Control       Visual Tracing       3D Tracking 08/03/22  Patient engaged in standing high speed volley using the fabric rackets and the small light weight spike ball - she was able to respond well with no reported symptoms.    07/25/22  Following the MVPT-4 test patient engaged in standing 3D tracking activity using the fabric rackets and a beach ball - pt able to accurately respond to moderate level challenges with no complaint of additional symptoms - body still sore from the weekend activities.     6/29/22  Seated tracking activity with \"Capture and Control\" using fabric rackets and a small blue spike ball - pt able to engage for approx 5 tosses then symptoms elevated - stopped activity - provided rest - this was the end of the session.     6/27/22  Seated tracking activity with \"Capture and Control\" using fabric rackets and a small blue spike ball - pt able to engage for approx 5 tosses then symptoms elevated - stopped activity - provided rest - this was the end of the session.     Visual Perception  07/25/22  Patient completed the entire MVPT-4 test in 45 minutes - score was well within normal limits at " "41/45  Patient in the 95th percentile - only mild increase in symptoms     Convergence/Divergence       Accommodation       Visual Stimulation       THER ACT  CPT 96607 TOTAL TIME FOR SESSION 0-7 Minutes   Pain, Vitals, Meds, Etc. Reviewed Reviewed   HEP 06/23/22  Reviewed and additionally explained and practiced the following home exercises to be completed every other day once AM and once PM using the \"1 to 10 functional concusion symptom scale\" as a guide to intensity  1) Level 1 horizontal saccades (needed quadrant cover and blue overlay to better tolerate all exercises provided today)  2) Level 1 vertical saccades  3) Leval 1 Tracing  4) Pencil Push Ups        Visual Endurance        Work/School Simulation        SELF CARE  CPT 71677 TOTAL TIME FOR SESSION 8-22 Minutes   PCS Symptom Management/Education 08/05/22  Reviewed energy and symptom management techniques and strategies especially as they pertain to ramping up her activities to a normal level post discharge today. Patient demonstrated good verbal understanding of the information and states that she will apply this approach in her daily life post discharge.    7/27/22  Provided additional eduction regarding energy management and dividing tasks into two categories - one exercise and two function - related to provocation of symptoms to a therapeutic level and management of symptoms for tasks - good verbal understanding demonstrated.    7/25/22  Reinforced that patient needs to slow down her daily activities and pace, have an escape plan, and inform family of temporary limitations to manage expectations - especially as it pertains to her involvement in the new house for future rental  - she agreed.  Reviewed the use of \"1 to 10 functional concussion symptom scale\" and energy and symptom management techniques and strategies for exercises and daily routine especially as a result of her episode of severe symptoms on 6/21/22. Specifically discussed use of " strategies to delegate some duties temporarily during recovery. Patient demonstrated good verbal understanding of the information and states will try to apply this approach in daily life. Provided information and practiced the use of blue overlays.           ADL/IADLs
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
## 2023-02-22 ENCOUNTER — TRANSCRIBE ORDERS (OUTPATIENT)
Dept: SLEEP MEDICINE | Facility: HOSPITAL | Age: 43
End: 2023-02-22

## 2023-02-22 DIAGNOSIS — G47.33 OBSTRUCTIVE SLEEP APNEA (ADULT) (PEDIATRIC): Primary | ICD-10-CM

## 2023-03-07 ENCOUNTER — HOSPITAL ENCOUNTER (OUTPATIENT)
Dept: SLEEP MEDICINE | Facility: HOSPITAL | Age: 43
Discharge: HOME | End: 2023-03-07
Attending: OTOLARYNGOLOGY
Payer: COMMERCIAL

## 2023-03-07 DIAGNOSIS — G47.33 OBSTRUCTIVE SLEEP APNEA (ADULT) (PEDIATRIC): ICD-10-CM

## 2023-03-07 PROCEDURE — G0399 HOME SLEEP TEST/TYPE 3 PORTA: HCPCS

## 2023-03-13 VITALS — BODY MASS INDEX: 22.34 KG/M2 | HEIGHT: 66 IN | WEIGHT: 139 LBS

## 2023-06-30 ENCOUNTER — TRANSCRIBE ORDERS (OUTPATIENT)
Dept: SCHEDULING | Age: 43
End: 2023-06-30

## 2023-06-30 DIAGNOSIS — N83.291 OTHER OVARIAN CYST, RIGHT SIDE: Primary | ICD-10-CM

## 2023-07-17 ENCOUNTER — HOSPITAL ENCOUNTER (OUTPATIENT)
Dept: RADIOLOGY | Facility: HOSPITAL | Age: 43
Discharge: HOME | End: 2023-07-17
Attending: OBSTETRICS & GYNECOLOGY
Payer: COMMERCIAL

## 2023-07-17 DIAGNOSIS — N83.291 OTHER OVARIAN CYST, RIGHT SIDE: ICD-10-CM

## 2023-07-17 PROCEDURE — 76830 TRANSVAGINAL US NON-OB: CPT

## 2024-01-29 ENCOUNTER — TRANSCRIBE ORDERS (OUTPATIENT)
Dept: SCHEDULING | Age: 44
End: 2024-01-29

## 2024-01-29 DIAGNOSIS — M54.50 LOW BACK PAIN, UNSPECIFIED: ICD-10-CM

## 2024-01-29 DIAGNOSIS — M54.16 RADICULOPATHY, LUMBAR REGION: Primary | ICD-10-CM

## 2024-02-09 ENCOUNTER — HOSPITAL ENCOUNTER (OUTPATIENT)
Dept: RADIOLOGY | Age: 44
Discharge: HOME | End: 2024-02-09
Attending: PAIN MEDICINE
Payer: COMMERCIAL

## 2024-02-09 DIAGNOSIS — M54.50 LOW BACK PAIN, UNSPECIFIED: ICD-10-CM

## 2024-02-09 DIAGNOSIS — M54.16 RADICULOPATHY, LUMBAR REGION: ICD-10-CM

## 2024-07-31 ENCOUNTER — APPOINTMENT (OUTPATIENT)
Dept: RADIOLOGY | Age: 44
End: 2024-07-31
Payer: COMMERCIAL

## 2024-07-31 ENCOUNTER — TRANSCRIBE ORDERS (OUTPATIENT)
Dept: REGISTRATION | Facility: CLINIC | Age: 44
End: 2024-07-31

## 2024-07-31 ENCOUNTER — HOSPITAL ENCOUNTER (OUTPATIENT)
Dept: RADIOLOGY | Facility: CLINIC | Age: 44
Discharge: HOME | End: 2024-07-31
Attending: GENERAL ACUTE CARE HOSPITAL
Payer: COMMERCIAL

## 2024-07-31 DIAGNOSIS — Z12.31 ENCOUNTER FOR SCREENING MAMMOGRAM FOR MALIGNANT NEOPLASM OF BREAST: ICD-10-CM

## 2024-07-31 DIAGNOSIS — Z12.31 ENCOUNTER FOR SCREENING MAMMOGRAM FOR MALIGNANT NEOPLASM OF BREAST: Primary | ICD-10-CM

## 2024-07-31 PROCEDURE — 77067 SCR MAMMO BI INCL CAD: CPT
